# Patient Record
Sex: MALE | Race: WHITE | NOT HISPANIC OR LATINO | Employment: FULL TIME | URBAN - METROPOLITAN AREA
[De-identification: names, ages, dates, MRNs, and addresses within clinical notes are randomized per-mention and may not be internally consistent; named-entity substitution may affect disease eponyms.]

---

## 2017-01-06 ENCOUNTER — HOSPITAL ENCOUNTER (EMERGENCY)
Facility: CLINIC | Age: 32
Discharge: HOME | End: 2017-01-06
Attending: PHYSICIAN ASSISTANT | Admitting: PHYSICIAN ASSISTANT
Payer: COMMERCIAL

## 2017-01-06 VITALS
SYSTOLIC BLOOD PRESSURE: 172 MMHG | RESPIRATION RATE: 16 BRPM | OXYGEN SATURATION: 99 % | TEMPERATURE: 98.5 F | DIASTOLIC BLOOD PRESSURE: 108 MMHG

## 2017-01-06 DIAGNOSIS — J32.0 LEFT MAXILLARY SINUSITIS: ICD-10-CM

## 2017-01-06 DIAGNOSIS — R03.0 ELEVATED BLOOD PRESSURE READING WITHOUT DIAGNOSIS OF HYPERTENSION: ICD-10-CM

## 2017-01-06 PROCEDURE — 99213 OFFICE O/P EST LOW 20 MIN: CPT | Performed by: PHYSICIAN ASSISTANT

## 2017-01-06 PROCEDURE — 99213 OFFICE O/P EST LOW 20 MIN: CPT

## 2017-01-06 NOTE — ED PROVIDER NOTES
History     Chief Complaint   Patient presents with     Sinusitis     HPI  Serge Mcfarlane is a 31 year old male here with concerns about sinus infection.  He states onset of symptoms three days ago.  He states that beginning last week he developed cold with nasal congestion, sore throat and cough.  In the last several days he has developed left maxillary sinus pressure and left sided dental pressure.  He has attempted treatment with DayQuil, last dose was 3 hours prior to arrival.  He states he has a history of syncopal sinus infection previously.  He denies any prior imaging of the sinuses are ENT evaluation.  No history of facial bone fracture.     Past Medical History   Diagnosis Date     NO ACTIVE PROBLEMS      Social History   Substance Use Topics     Smoking status: Current Every Day Smoker -- 1.00 packs/day for 6 years     Types: Cigarettes     Smokeless tobacco: Not on file     Alcohol Use: 15.0 oz/week     I have reviewed the Medications, Allergies, Past Medical and Surgical History, and Social History in the Epic system.    Review of Systems  CONSTITUTIONAL:NEGATIVE for fever, chills, change in weight  INTEGUMENTARY/SKIN: NEGATIVE for worrisome rashes, moles or lesions  EYES: NEGATIVE for vision changes or irritation  ENT/MOUTH: POSITIVE for nasal congestion, left sided sinus pressure, resolved sore throat   RESP:POSITIVE for resolved cough and NEGATIVE for wheezing, SOB/dyspnea  GI: NEGATIVE for abdominal pain, diarrhea, nausea and vomiting  Physical Exam   /108 mmHg  Temp(Src) 98.5  F (36.9  C) (Oral)  Resp 16  SpO2 99%  Physical Exam  Exam:GENERAL APPEARANCE: healthy, alert and no distress  EYES: EOMI,  PERRL, conjunctiva clear  HENT: ear canals and TM's normal.  Nasal mucosa edema.  Tenderness on palpation of the left maxillary sinus.  Posterior pharynx is not erythematous that exudate.  NECK: supple, nontender, no lymphadenopathy  RESP: lungs clear to auscultation - no rales, rhonchi or  wheezes  CV: regular rates and rhythm, normal S1 S2, no murmur noted  SKIN: no suspicious lesions or rashes  ED Course   Procedures        Critical Care time:  none            Labs Ordered and Resulted from Time of ED Arrival Up to the Time of Departure from the ED - No data to display    Assessments & Plan (with Medical Decision Making)     I have reviewed the nursing notes.    I have reviewed the findings, diagnosis, plan and need for follow up with the patient.    Discharge Medication List as of 1/6/2017  5:25 PM      START taking these medications    Details   amoxicillin-clavulanate (AUGMENTIN) 875-125 MG per tablet Take 1 tablet by mouth 2 times daily for 10 days, Disp-20 tablet, R-0, E-Prescribe           Final diagnoses:   Left maxillary sinusitis   Elevated blood pressure reading without diagnosis of hypertension    31-year-old male presents to urgent care with concerns for possible sinus infection given three-day history of left-sided sinus pain which was preceded by 1 week history of nasal congestion and cough.  Patient was noted to be significantly hypertensive upon arrival, remainder of vital signs within normal limits.  As above exam findings as described above were consistent with sinusitis.  No focal tendon tenderness to palpation to suggest odontalgia, dental abscess.  This is a patient that generally sinus infections are viral and will resolve spontaneously however given the unilateral pain and  worsening nature of his symptoms, I will cover him with antibiotic for possible bacterial infection.  Differential for his symptoms also include viral URI, allergic rhinitis.  He was discharged home stable with prescription for Augmentin.  He was instructed to use OTC Flonase.  Follow up with PCP if not better within the next 5-7 days.  Worrisome reasons to return to ER/UC sooner discussed.      Disclaimer: This note consists of symbols derived from keyboarding, dictation, and/or voice recognition software.  As a result, there may be errors in the script that have gone undetected.  Please consider this when interpreting information found in the chart.    1/6/2017   [facility] EMERGENCY DEPARTMENT      Avelina Moreno PA-C  01/08/17 2019

## 2017-01-06 NOTE — ED AVS SNAPSHOT
[facility] Emergency Department    [address] [address]    Phone:  [phone]    Fax:  [phone]                                       Serge Mcfarlane   MRN: [id number]    Department:  [facility] Emergency Department   Date of Visit:  1/6/2017           After Visit Summary Signature Page     I have received my discharge instructions, and my questions have been answered. I have discussed any challenges I see with this plan with the nurse or doctor.    ..........................................................................................................................................  Patient/Patient Representative Signature      ..........................................................................................................................................  Patient Representative Print Name and Relationship to Patient    ..................................................               ................................................  Date                                            Time    ..........................................................................................................................................  Reviewed by Signature/Title    ...................................................              ..............................................  Date                                                            Time

## 2017-01-08 ENCOUNTER — APPOINTMENT (OUTPATIENT)
Dept: CT IMAGING | Facility: CLINIC | Age: 32
End: 2017-01-08
Attending: EMERGENCY MEDICINE
Payer: COMMERCIAL

## 2017-01-08 ENCOUNTER — HOSPITAL ENCOUNTER (EMERGENCY)
Facility: CLINIC | Age: 32
Discharge: HOME | End: 2017-01-08
Attending: EMERGENCY MEDICINE | Admitting: EMERGENCY MEDICINE
Payer: COMMERCIAL

## 2017-01-08 VITALS
DIASTOLIC BLOOD PRESSURE: 96 MMHG | HEART RATE: 84 BPM | OXYGEN SATURATION: 97 % | SYSTOLIC BLOOD PRESSURE: 141 MMHG | RESPIRATION RATE: 16 BRPM | TEMPERATURE: 98.1 F

## 2017-01-08 DIAGNOSIS — R51.9 LEFT-SIDED FACE PAIN: ICD-10-CM

## 2017-01-08 DIAGNOSIS — J01.00 ACUTE MAXILLARY SINUSITIS, RECURRENCE NOT SPECIFIED: ICD-10-CM

## 2017-01-08 LAB
ANION GAP SERPL CALCULATED.3IONS-SCNC: 9 MMOL/L (ref 3–14)
BASOPHILS # BLD AUTO: 0.1 10E9/L (ref 0–0.2)
BASOPHILS NFR BLD AUTO: 0.5 %
BUN SERPL-MCNC: 15 MG/DL (ref 7–30)
CALCIUM SERPL-MCNC: 9.1 MG/DL (ref 8.5–10.1)
CHLORIDE SERPL-SCNC: 106 MMOL/L (ref 94–109)
CO2 SERPL-SCNC: 27 MMOL/L (ref 20–32)
CREAT SERPL-MCNC: 0.92 MG/DL (ref 0.66–1.25)
DIFFERENTIAL METHOD BLD: ABNORMAL
EOSINOPHIL # BLD AUTO: 0.2 10E9/L (ref 0–0.7)
EOSINOPHIL NFR BLD AUTO: 1.6 %
ERYTHROCYTE [DISTWIDTH] IN BLOOD BY AUTOMATED COUNT: 11.9 % (ref 10–15)
GFR SERPL CREATININE-BSD FRML MDRD: NORMAL ML/MIN/1.7M2
GLUCOSE SERPL-MCNC: 98 MG/DL (ref 70–99)
HCT VFR BLD AUTO: 47.8 % (ref 40–53)
HGB BLD-MCNC: 16.4 G/DL (ref 13.3–17.7)
IMM GRANULOCYTES # BLD: 0 10E9/L (ref 0–0.4)
IMM GRANULOCYTES NFR BLD: 0.1 %
LYMPHOCYTES # BLD AUTO: 1.5 10E9/L (ref 0.8–5.3)
LYMPHOCYTES NFR BLD AUTO: 16 %
MCH RBC QN AUTO: 29.7 PG (ref 26.5–33)
MCHC RBC AUTO-ENTMCNC: 34.3 G/DL (ref 31.5–36.5)
MCV RBC AUTO: 86 FL (ref 78–100)
MONOCYTES # BLD AUTO: 1.6 10E9/L (ref 0–1.3)
MONOCYTES NFR BLD AUTO: 16.4 %
NEUTROPHILS # BLD AUTO: 6.2 10E9/L (ref 1.6–8.3)
NEUTROPHILS NFR BLD AUTO: 65.4 %
PLATELET # BLD AUTO: 200 10E9/L (ref 150–450)
POTASSIUM SERPL-SCNC: 4.1 MMOL/L (ref 3.4–5.3)
RBC # BLD AUTO: 5.53 10E12/L (ref 4.4–5.9)
SODIUM SERPL-SCNC: 142 MMOL/L (ref 133–144)
WBC # BLD AUTO: 9.5 10E9/L (ref 4–11)

## 2017-01-08 PROCEDURE — 25000128 H RX IP 250 OP 636: Performed by: EMERGENCY MEDICINE

## 2017-01-08 PROCEDURE — 96361 HYDRATE IV INFUSION ADD-ON: CPT

## 2017-01-08 PROCEDURE — 70486 CT MAXILLOFACIAL W/O DYE: CPT

## 2017-01-08 PROCEDURE — 96374 THER/PROPH/DIAG INJ IV PUSH: CPT

## 2017-01-08 PROCEDURE — 99285 EMERGENCY DEPT VISIT HI MDM: CPT | Performed by: EMERGENCY MEDICINE

## 2017-01-08 PROCEDURE — 25000132 ZZH RX MED GY IP 250 OP 250 PS 637: Performed by: EMERGENCY MEDICINE

## 2017-01-08 PROCEDURE — 99285 EMERGENCY DEPT VISIT HI MDM: CPT | Mod: 25

## 2017-01-08 PROCEDURE — 96375 TX/PRO/DX INJ NEW DRUG ADDON: CPT

## 2017-01-08 PROCEDURE — 25500064 ZZH RX 255 OP 636: Performed by: EMERGENCY MEDICINE

## 2017-01-08 PROCEDURE — 25000125 ZZHC RX 250: Performed by: EMERGENCY MEDICINE

## 2017-01-08 PROCEDURE — 70498 CT ANGIOGRAPHY NECK: CPT

## 2017-01-08 PROCEDURE — 80048 BASIC METABOLIC PNL TOTAL CA: CPT | Performed by: EMERGENCY MEDICINE

## 2017-01-08 PROCEDURE — 70450 CT HEAD/BRAIN W/O DYE: CPT

## 2017-01-08 PROCEDURE — 85025 COMPLETE CBC W/AUTO DIFF WBC: CPT | Performed by: EMERGENCY MEDICINE

## 2017-01-08 RX ORDER — OXYCODONE AND ACETAMINOPHEN 5; 325 MG/1; MG/1
2 TABLET ORAL ONCE
Status: COMPLETED | OUTPATIENT
Start: 2017-01-08 | End: 2017-01-08

## 2017-01-08 RX ORDER — ONDANSETRON 2 MG/ML
4 INJECTION INTRAMUSCULAR; INTRAVENOUS ONCE
Status: COMPLETED | OUTPATIENT
Start: 2017-01-08 | End: 2017-01-08

## 2017-01-08 RX ORDER — SODIUM CHLORIDE 9 MG/ML
1000 INJECTION, SOLUTION INTRAVENOUS CONTINUOUS
Status: DISCONTINUED | OUTPATIENT
Start: 2017-01-08 | End: 2017-01-08 | Stop reason: HOSPADM

## 2017-01-08 RX ORDER — IOPAMIDOL 755 MG/ML
70 INJECTION, SOLUTION INTRAVASCULAR ONCE
Status: COMPLETED | OUTPATIENT
Start: 2017-01-08 | End: 2017-01-08

## 2017-01-08 RX ORDER — HYDROMORPHONE HYDROCHLORIDE 1 MG/ML
0.5 INJECTION, SOLUTION INTRAMUSCULAR; INTRAVENOUS; SUBCUTANEOUS ONCE
Status: COMPLETED | OUTPATIENT
Start: 2017-01-08 | End: 2017-01-08

## 2017-01-08 RX ORDER — OXYCODONE AND ACETAMINOPHEN 5; 325 MG/1; MG/1
1-2 TABLET ORAL EVERY 4 HOURS PRN
Qty: 15 TABLET | Refills: 0 | Status: SHIPPED | OUTPATIENT
Start: 2017-01-08 | End: 2023-03-09

## 2017-01-08 RX ADMIN — HYDROMORPHONE HYDROCHLORIDE 0.5 MG: 1 INJECTION, SOLUTION INTRAMUSCULAR; INTRAVENOUS; SUBCUTANEOUS at 13:31

## 2017-01-08 RX ADMIN — SODIUM CHLORIDE 75 ML: 9 INJECTION, SOLUTION INTRAVENOUS at 13:46

## 2017-01-08 RX ADMIN — SODIUM CHLORIDE 1000 ML: 9 INJECTION, SOLUTION INTRAVENOUS at 13:30

## 2017-01-08 RX ADMIN — IOPAMIDOL 70 ML: 755 INJECTION, SOLUTION INTRAVENOUS at 13:46

## 2017-01-08 RX ADMIN — OXYCODONE HYDROCHLORIDE AND ACETAMINOPHEN 2 TABLET: 5; 325 TABLET ORAL at 12:55

## 2017-01-08 RX ADMIN — ONDANSETRON 4 MG: 2 INJECTION INTRAMUSCULAR; INTRAVENOUS at 13:30

## 2017-01-08 ASSESSMENT — ENCOUNTER SYMPTOMS
CONSTITUTIONAL NEGATIVE: 1
TROUBLE SWALLOWING: 0
FACIAL SWELLING: 1
SORE THROAT: 0
RESPIRATORY NEGATIVE: 1
SINUS PRESSURE: 1
RHINORRHEA: 1
NEUROLOGICAL NEGATIVE: 1

## 2017-01-08 NOTE — ED AVS SNAPSHOT
" [facility] [place] Emergency Department    [address] [address]    Phone:  [phone]    Fax:  [phone]                                       Serge Mcfarlane   MRN: [id number]    Department:  [facility] [place] Emergency Department   Date of Visit:  1/8/2017           Patient Information     Date Of Birth          1985        Your diagnoses for this visit were:     Acute maxillary sinusitis, recurrence not specified     Left-sided face pain Severe pain refractory to NSAIDs, secondary to sinusitis       You were seen by Kenton Gil MD.      Follow-up Information     Schedule an appointment as soon as possible for a visit with [facility].    Specialty:  ENT    Why:  Follow up in ear, nose, throat clinic for re-evaluation if symptoms not resolving    Contact information:    [address] [place] [address] [phone]    Additional information:    The medical center is located at   [address] [place] [address] (between [facility] and   [facility] [place], [place] north   of [place]).        Follow up with Primary care clinic.    Why:  For re-evaluation if symptoms not resolving over the next several days        Discharge Instructions         Understanding Nasal Anatomy: Inside View  There is a lot happening under the surface of the nose. The bone and cartilage under the skin give the nose most of its size and shape. Other structures inside and behind the nose help you breathe. Learning the anatomy of the nose can help you further understand how the nose works.       Bone supports the upper third (bridge) of the nose. The upper cartilage supports the side of the nose. The lower cartilage adds support, width, and height and helps shape the nostrils and the tip of the nose. Skin also helps shape the nose.          The nasal cavity is a hollow space behind the nose that air flows through. The septum is a thin \"wall\" made of cartilage and bone. It divides the " inside of the nose into two chambers. The mucous membrane is thin tissue that lines the nose, sinuses, and throat. It warms and moistens the air you breathe in. It also makes the sticky mucus that helps clean the air of dust and other small particles. The turbinates on each side of the nose are curved, bony ridges lined with mucous membrane. They warm and moisten the air you breathe in. The sinuses are hollow, air-filled chambers in the bone around your nose. Mucus from the sinuses drains into the nasal cavity.    7540-4797 [employer]. [address] [place] [address]. All rights reserved. This information is not intended as a substitute for professional medical care. Always follow your healthcare professional's instructions.          Discharge References/Attachments     SINUSITIS, ACUTE (ENGLISH)    SINUSITIS (ANTIBIOTIC TREATMENT) (ENGLISH)    SINUS HEADACHE (ENGLISH)    SINUSITIS, SELF-CARE FOR  (ENGLISH)    SINUSITIS, PREVENTING  (ENGLISH)      24 Hour Appointment Hotline       To make an appointment at any [facility], call [phone] ([phone]). If you don't have a family doctor or clinic, we will help you find one. [facility] clinics are conveniently located to serve the needs of you and your family.             Review of your medicines      START taking        Dose / Directions Last dose taken    oxyCODONE-acetaminophen 5-325 MG per tablet   Commonly known as:  PERCOCET   Dose:  1-2 tablet   Quantity:  15 tablet        Take 1-2 tablets by mouth every 4 hours as needed for pain   Refills:  0          Our records show that you are taking the medicines listed below. If these are incorrect, please call your family doctor or clinic.        Dose / Directions Last dose taken    amoxicillin-clavulanate 875-125 MG per tablet   Commonly known as:  AUGMENTIN   Dose:  1 tablet   Quantity:  20 tablet        Take 1 tablet by mouth 2 times daily for 10 days   Refills:  0        IBUPROFEN  PO   Dose:  600 mg        Take 600 mg by mouth   Refills:  0                Prescriptions were sent or printed at these locations (1 Prescription)                   Other Prescriptions                Printed at Department/Unit printer (1 of 1)         oxyCODONE-acetaminophen (PERCOCET) 5-325 MG per tablet                Procedures and tests performed during your visit     Basic metabolic panel    CBC with platelets, differential    CT Head Neck Angio w/o & w Contrast    CT Head w/o Contrast    Maxillofacial  CT w/o contrast      Orders Needing Specimen Collection     None      Pending Results     Date and Time Order Name Status Description    1/8/2017 1310 CT Head Neck Angio w/o & w Contrast In process     1/8/2017 1310 CT Head w/o Contrast Preliminary             Pending Culture Results     No orders found from 1/7/2017 to 1/9/2017.       Test Results from your hospital stay           1/8/2017  1:44 PM - Interface, Flexilab Results      Component Results     Component Value Ref Range & Units Status    WBC 9.5 4.0 - 11.0 10e9/L Final    RBC Count 5.53 4.4 - 5.9 10e12/L Final    Hemoglobin 16.4 13.3 - 17.7 g/dL Final    Hematocrit 47.8 40.0 - 53.0 % Final    MCV 86 78 - 100 fl Final    MCH 29.7 26.5 - 33.0 pg Final    MCHC 34.3 31.5 - 36.5 g/dL Final    RDW 11.9 10.0 - 15.0 % Final    Platelet Count 200 150 - 450 10e9/L Final    Diff Method Automated Method  Final    % Neutrophils 65.4 % Final    % Lymphocytes 16.0 % Final    % Monocytes 16.4 % Final    % Eosinophils 1.6 % Final    % Basophils 0.5 % Final    % Immature Granulocytes 0.1 % Final    Absolute Neutrophil 6.2 1.6 - 8.3 10e9/L Final    Absolute Lymphocytes 1.5 0.8 - 5.3 10e9/L Final    Absolute Monocytes 1.6 (H) 0.0 - 1.3 10e9/L Final    Absolute Eosinophils 0.2 0.0 - 0.7 10e9/L Final    Absolute Basophils 0.1 0.0 - 0.2 10e9/L Final    Abs Immature Granulocytes 0.0 0 - 0.4 10e9/L Final         1/8/2017  1:57 PM - Interface, Flexilab Results      Component  Results     Component Value Ref Range & Units Status    Sodium 142 133 - 144 mmol/L Final    Potassium 4.1 3.4 - 5.3 mmol/L Final    Chloride 106 94 - 109 mmol/L Final    Carbon Dioxide 27 20 - 32 mmol/L Final    Anion Gap 9 3 - 14 mmol/L Final    Glucose 98 70 - 99 mg/dL Final    Urea Nitrogen 15 7 - 30 mg/dL Final    Creatinine 0.92 0.66 - 1.25 mg/dL Final    GFR Estimate >90  Non  GFR Calc   >60 mL/min/1.7m2 Final    GFR Estimate If Black >90   GFR Calc   >60 mL/min/1.7m2 Final    Calcium 9.1 8.5 - 10.1 mg/dL Final         1/8/2017  2:02 PM - Interface, Radiant Ib      Narrative     CT HEAD WITHOUT CONTRAST 1/8/2017 1:49 PM     HISTORY:  Severe left retro-orbital and facial headache.    TECHNIQUE:  Axial images of the head without IV contrast material.  Radiation dose for this scan was reduced using automated exposure  control, adjustment of the mA and/or kV according to patient size, or  iterative reconstruction technique.    COMPARISON:  None.    FINDINGS:  No intracranial hemorrhage, mass lesion, or acute infarct  is seen. Left maxillary sinus fluid and left ethmoid sinus mucosal  thickening.        Impression     IMPRESSION:  No acute intracranial pathology. Sinusitis.         1/8/2017  1:37 PM - Lashon Ortiz         1/8/2017  3:32 PM - Interface, Radiant Ib      Narrative     CT MAXILLOFACIAL WITHOUT CONTRAST 1/8/2017 2:18 PM     HISTORY: Severe left facial and retro-orbital headache.    TECHNIQUE: Axial images were obtained through the facial bones and  sinuses without contrast. Coronal and sagittal reconstructions were  also acquired. Radiation dose for this scan was reduced using  automated exposure control, adjustment of the mA and/or kV according  to patient size, or iterative reconstruction technique.    COMPARISON: 11/2/2013.    FINDINGS: There is near complete opacification of the left maxillary  sinus with a fluid level. There is some minimal mucosal thickening  "in  the left anterior ethmoid air cells. Incidental note is made of  retention cyst in the right maxillary sinus. Remainder of paranasal  sinuses are clear. The retromaxillary fat in the pterygopalatine fossa  regions are normal. Both globes and retro-orbital structures are  normal.        Impression     IMPRESSION: Left maxillary and ethmoid sinus disease. The presence of  fluid in the left maxillary sinus is consistent with acute sinusitis.    BECKY TANG MD                Thank you for choosing [facility]       Thank you for choosing [facility] for your care. Our goal is always to provide you with excellent care. Hearing back from our patients is one way we can continue to improve our services. Please take a few minutes to complete the written survey that you may receive in the mail after you visit with us. Thank you!        [employer] Information     [email or web address] lets you send messages to your doctor, view your test results, renew your prescriptions, schedule appointments and more. To sign up, go to [email or web address] . Click on \"Log in\" on the left side of the screen, which will take you to the Welcome page. Then click on \"Sign up Now\" on the right side of the page.     You will be asked to enter the access code listed below, as well as some personal information. Please follow the directions to create your username and password.     Your access code is: [email or web address]  Expires: 2017  5:25 PM     Your access code will  in 90 days. If you need help or a new code, please call your [facility] clinic or [phone].        Care EveryWhere ID     This is your Care EveryWhere ID. This could be used by other organizations to access your [facility] medical records  [id number]        After Visit Summary       This is your record. Keep this with you and show to your community pharmacist(s) and doctor(s) at your next visit.                  "

## 2017-01-08 NOTE — ED PROVIDER NOTES
History     Chief Complaint   Patient presents with     Facial Pain     was here friday no relief with medications     HPI  Serge Mcfarlane is a 31 year old male who presents to the emergency department for facial pain. Patient was last seen in the ED on Friday evening, 1/6/17, for evaluation for sinus infection symtoms as well as URI symptoms that started about a week and a half ago. Additonally, he developed left maxillary sinus pressure and dental pressure at that time. Severe sharp, aching pain exacerbated by chewing and dental pressure.  Pain is constant, radiates upward and is worse with change in position and head movement. He is currently experiencing a sharp pain on left side of face around eye, nose, sinus area. Denies nasal drainage. Wife and child were ill on 12/24/16. Took 600 mg ibuprofen around 1100 AM this morning.    No visual or eye abnormality.  No neurologic deficit or abnormality no fever or chills.  Patient was prescribed Augmentin but has felt no relief or improvement after 4 doses of therapy.     I have reviewed the Medications, Allergies, Past Medical and Surgical History, and Social History in the Epic system.  There is no problem list on file for this patient.      Current Outpatient Prescriptions   Medication Sig Dispense Refill     IBUPROFEN PO Take 600 mg by mouth       oxyCODONE-acetaminophen (PERCOCET) 5-325 MG per tablet Take 1-2 tablets by mouth every 4 hours as needed for pain 15 tablet 0     amoxicillin-clavulanate (AUGMENTIN) 875-125 MG per tablet Take 1 tablet by mouth 2 times daily for 20 doses 20 tablet 0       No Known Allergies    Social History   Substance Use Topics     Smoking status: Current Every Day Smoker -- 1.00 packs/day for 6 years     Types: Cigarettes     Smokeless tobacco: Not on file     Alcohol Use: 15.0 oz/week         Review of Systems   Constitutional: Negative.    HENT: Positive for facial swelling, rhinorrhea and sinus pressure. Negative for congestion,  dental problem, drooling, ear discharge, ear pain, sore throat, tinnitus and trouble swallowing.    Respiratory: Negative.    Skin: Negative.    Neurological: Negative.    As mentioned above in the history present illness.  All other systems were reviewed and are negative.      Physical Exam   Pulse: 84  Heart Rate: 84  Temp: 98.1  F (36.7  C)  Resp: 16  SpO2: 97 %    Physical Exam   Constitutional: He is oriented to person, place, and time. He appears well-developed and well-nourished. No distress.   HENT:   Head: Normocephalic and atraumatic.   Right Ear: External ear normal.   Left Ear: External ear normal.   Mouth/Throat: Oropharynx is clear and moist. No oropharyngeal exudate.   Bilateral nasal mucosal injection with significantly injected left nasal mucosa.   Eyes: Conjunctivae and EOM are normal. Pupils are equal, round, and reactive to light. No scleral icterus.   Neck: Normal range of motion. Neck supple. No JVD present. No tracheal deviation present. No thyromegaly present.   Cardiovascular: Normal rate, regular rhythm and normal heart sounds.  Exam reveals no gallop and no friction rub.    No murmur heard.  Pulmonary/Chest: Effort normal and breath sounds normal. No stridor. No respiratory distress. He has no wheezes. He has no rales.   Musculoskeletal: Normal range of motion. He exhibits no edema.   Lymphadenopathy:     He has no cervical adenopathy.   Neurological: He is alert and oriented to person, place, and time. He has normal strength. Cranial nerve deficit: 2-12 intact. Coordination and gait normal.   Skin: Skin is warm and dry. No rash noted. He is not diaphoretic. No erythema. No pallor.   Psychiatric: His behavior is normal.   Anxious affect.   Nursing note and vitals reviewed.    As mentioned above in the history present illness. All other systems were reviewed and are negative.    ED Course   Procedures            CTA angiogram head and neck: Preliminary results -  lt max sinus dz with AFL.    neg cta.  final report after recons.    Results for orders placed or performed during the hospital encounter of 01/08/17   CT Head w/o Contrast    Narrative    CT HEAD WITHOUT CONTRAST 1/8/2017 1:49 PM     HISTORY:  Severe left retro-orbital and facial headache.    TECHNIQUE:  Axial images of the head without IV contrast material.  Radiation dose for this scan was reduced using automated exposure  control, adjustment of the mA and/or kV according to patient size, or  iterative reconstruction technique.    COMPARISON:  None.    FINDINGS:  No intracranial hemorrhage, mass lesion, or acute infarct  is seen. Left maxillary sinus fluid and left ethmoid sinus mucosal  thickening.      Impression    IMPRESSION:  No acute intracranial pathology. Sinusitis.   CT Head Neck Angio w/o & w Contrast    Narrative    CTA ANGIOGRAM HEAD NECK 1/8/2017 2:13 PM     HISTORY: Severe left retro-orbital and facial headache.    TECHNIQUE: Axial images were obtained through the head and neck  without and with intravenous contrast. 70 mL of Isovue-370 was given.  Multiplanar reconstructions were performed. 3-D reconstructions off a  remote workstation for CT angiography were also acquired. Carotid  stenoses were provided by comparing the caliber of the proximal  internal carotid arteries to the caliber of the distal internal  carotid arteries. Radiation dose for this scan was reduced using  automated exposure control, adjustment of the mA and/or kV according  to patient size, or iterative reconstruction technique.    FINDINGS:    Brachiocephalic vessels: Normal.    Right carotid system: Normal.    Left carotid system: Normal.    Right vertebral artery: Normal.    Left vertebral artery: Normal.    Senoia of Zimm[place]: Normal.    Other findings: Extensive left maxillary sinus disease with air-fluid  level present. There is also some left ethmoid opacification. Images  through the neck are unremarkable.      Impression    IMPRESSION:  1. Negative  CT angiography of head and neck.  2. Left maxillary ethmoid and sinus disease with air-fluid level in  left maxillary sinus suggesting acute sinusitis.     BECKY TANG MD   Maxillofacial  CT w/o contrast    Narrative    CT MAXILLOFACIAL WITHOUT CONTRAST 1/8/2017 2:18 PM     HISTORY: Severe left facial and retro-orbital headache.    TECHNIQUE: Axial images were obtained through the facial bones and  sinuses without contrast. Coronal and sagittal reconstructions were  also acquired. Radiation dose for this scan was reduced using  automated exposure control, adjustment of the mA and/or kV according  to patient size, or iterative reconstruction technique.    COMPARISON: 11/2/2013.    FINDINGS: There is near complete opacification of the left maxillary  sinus with a fluid level. There is some minimal mucosal thickening in  the left anterior ethmoid air cells. Incidental note is made of  retention cyst in the right maxillary sinus. Remainder of paranasal  sinuses are clear. The retromaxillary fat in the pterygopalatine fossa  regions are normal. Both globes and retro-orbital structures are  normal.      Impression    IMPRESSION: Left maxillary and ethmoid sinus disease. The presence of  fluid in the left maxillary sinus is consistent with acute sinusitis.    BECKY TANG MD   CBC with platelets, differential   Result Value Ref Range    WBC 9.5 4.0 - 11.0 10e9/L    RBC Count 5.53 4.4 - 5.9 10e12/L    Hemoglobin 16.4 13.3 - 17.7 g/dL    Hematocrit 47.8 40.0 - 53.0 %    MCV 86 78 - 100 fl    MCH 29.7 26.5 - 33.0 pg    MCHC 34.3 31.5 - 36.5 g/dL    RDW 11.9 10.0 - 15.0 %    Platelet Count 200 150 - 450 10e9/L    Diff Method Automated Method     % Neutrophils 65.4 %    % Lymphocytes 16.0 %    % Monocytes 16.4 %    % Eosinophils 1.6 %    % Basophils 0.5 %    % Immature Granulocytes 0.1 %    Absolute Neutrophil 6.2 1.6 - 8.3 10e9/L    Absolute Lymphocytes 1.5 0.8 - 5.3 10e9/L    Absolute Monocytes 1.6 (H) 0.0 - 1.3 10e9/L     Absolute Eosinophils 0.2 0.0 - 0.7 10e9/L    Absolute Basophils 0.1 0.0 - 0.2 10e9/L    Abs Immature Granulocytes 0.0 0 - 0.4 10e9/L   Basic metabolic panel   Result Value Ref Range    Sodium 142 133 - 144 mmol/L    Potassium 4.1 3.4 - 5.3 mmol/L    Chloride 106 94 - 109 mmol/L    Carbon Dioxide 27 20 - 32 mmol/L    Anion Gap 9 3 - 14 mmol/L    Glucose 98 70 - 99 mg/dL    Urea Nitrogen 15 7 - 30 mg/dL    Creatinine 0.92 0.66 - 1.25 mg/dL    GFR Estimate >90  Non  GFR Calc   >60 mL/min/1.7m2    GFR Estimate If Black >90   GFR Calc   >60 mL/min/1.7m2    Calcium 9.1 8.5 - 10.1 mg/dL          Medications   0.9% sodium chloride infusion (not administered)   oxyCODONE-acetaminophen (PERCOCET) 5-325 MG per tablet 2 tablet (2 tablets Oral Given 1/8/17 1255)   0.9% sodium chloride BOLUS (0 mLs Intravenous Stopped 1/8/17 1558)   HYDROmorphone (PF) (DILAUDID) injection 0.5 mg (0.5 mg Intravenous Given 1/8/17 1331)   ondansetron (ZOFRAN) injection 4 mg (4 mg Intravenous Given 1/8/17 1330)   iopamidol (ISOVUE-370) solution 70 mL (70 mLs Intravenous Given 1/8/17 1346)   sodium chloride 0.9 % for CT scan flush dose 75 mL (75 mLs As instructed Given 1/8/17 1346)     Discussed risk and benefits of CT evaluation and patient wishes to proceed with CT evaluation.    Assessments & Plan (with Medical Decision Making)   Left maxillary and ethmoid sinusitis with severe facial pain and symptoms refractory to 2 days of Augmentin therapy, 4 doses, and NSAIDs.  Patient wished to proceed with CT evaluation due to severity and intractability of the pain.  CT/CTA evaluation of the head and neck were only remarkable for sinusitis.  Will continue Augmentin, add 10 days of therapy to complete 20 days of therapy, and Rx Percocet for pain refractory to NSAIDs.  Instructed in supportive care.  Patient was provided instructions for supportive care and will return as needed for worsened condition or worsening symptoms, or  new problems or concerns.    I  have reviewed the nursing notes.    I have reviewed the findings, diagnosis, plan and need for follow up with the patient.    Discharge Medication List as of 1/8/2017  3:58 PM      START taking these medications    Details   oxyCODONE-acetaminophen (PERCOCET) 5-325 MG per tablet Take 1-2 tablets by mouth every 4 hours as needed for pain, Disp-15 tablet, R-0, Local Print             Final diagnoses:   Acute maxillary sinusitis, recurrence not specified   Left-sided face pain - Severe pain refractory to NSAIDs, secondary to sinusitis     This document serves as a record of the services and decisions personally performed and made by Kenton Gil MD. It was created on HIS/HER behalf by Lisette Cardona, a trained medical scribe. The creation of this document is based the provider's statements to the medical scribe.  Lisette Cardona 12:33 PM 1/8/2017    Provider:   The information in this document, created by the medical scribe for me, accurately reflects the services I personally performed and the decisions made by me. I have reviewed and approved this document for accuracy prior to leaving the patient care area.  Kenton Gil MD 12:33 PM 1/8/2017 1/8/2017   [facility] EMERGENCY DEPARTMENT      Kenton Gil MD  01/08/17 1614    Kenton Gil MD  01/08/17 9107

## 2017-01-08 NOTE — ED AVS SNAPSHOT
[facility] Emergency Department    [address] [address]    Phone:  [phone]    Fax:  [phone]                                       Serge Mcfarlane   MRN: [id number]    Department:  [facility] Emergency Department   Date of Visit:  1/8/2017           After Visit Summary Signature Page     I have received my discharge instructions, and my questions have been answered. I have discussed any challenges I see with this plan with the nurse or doctor.    ..........................................................................................................................................  Patient/Patient Representative Signature      ..........................................................................................................................................  Patient Representative Print Name and Relationship to Patient    ..................................................               ................................................  Date                                            Time    ..........................................................................................................................................  Reviewed by Signature/Title    ...................................................              ..............................................  Date                                                            Time

## 2017-01-08 NOTE — DISCHARGE INSTRUCTIONS
"  Understanding Nasal Anatomy: Inside View  There is a lot happening under the surface of the nose. The bone and cartilage under the skin give the nose most of its size and shape. Other structures inside and behind the nose help you breathe. Learning the anatomy of the nose can help you further understand how the nose works.       Bone supports the upper third (bridge) of the nose. The upper cartilage supports the side of the nose. The lower cartilage adds support, width, and height and helps shape the nostrils and the tip of the nose. Skin also helps shape the nose.          The nasal cavity is a hollow space behind the nose that air flows through. The septum is a thin \"wall\" made of cartilage and bone. It divides the inside of the nose into two chambers. The mucous membrane is thin tissue that lines the nose, sinuses, and throat. It warms and moistens the air you breathe in. It also makes the sticky mucus that helps clean the air of dust and other small particles. The turbinates on each side of the nose are curved, bony ridges lined with mucous membrane. They warm and moisten the air you breathe in. The sinuses are hollow, air-filled chambers in the bone around your nose. Mucus from the sinuses drains into the nasal cavity.    [id number] [employer]. [address] [place] [address], [place] [address]. All rights reserved. This information is not intended as a substitute for professional medical care. Always follow your healthcare professional's instructions.        "

## 2018-04-09 ENCOUNTER — OFFICE VISIT (OUTPATIENT)
Dept: UROLOGY | Facility: CLINIC | Age: 33
End: 2018-04-09
Payer: COMMERCIAL

## 2018-04-09 VITALS — DIASTOLIC BLOOD PRESSURE: 98 MMHG | HEART RATE: 92 BPM | SYSTOLIC BLOOD PRESSURE: 169 MMHG | TEMPERATURE: 98.7 F

## 2018-04-09 DIAGNOSIS — R30.0 DYSURIA: Primary | ICD-10-CM

## 2018-04-09 DIAGNOSIS — N30.00 ACUTE CYSTITIS WITHOUT HEMATURIA: ICD-10-CM

## 2018-04-09 LAB
ALBUMIN UR-MCNC: NEGATIVE MG/DL
APPEARANCE UR: CLEAR
BILIRUB UR QL STRIP: NEGATIVE
COLOR UR AUTO: YELLOW
GLUCOSE UR STRIP-MCNC: NEGATIVE MG/DL
HGB UR QL STRIP: NEGATIVE
KETONES UR STRIP-MCNC: NEGATIVE MG/DL
LEUKOCYTE ESTERASE UR QL STRIP: NEGATIVE
NITRATE UR QL: NEGATIVE
PH UR STRIP: 6 PH (ref 5–7)
RBC #/AREA URNS AUTO: NORMAL /HPF
SOURCE: NORMAL
SP GR UR STRIP: 1.02 (ref 1–1.03)
UROBILINOGEN UR STRIP-ACNC: 0.2 EU/DL (ref 0.2–1)
WBC #/AREA URNS AUTO: NORMAL /HPF

## 2018-04-09 PROCEDURE — 81001 URINALYSIS AUTO W/SCOPE: CPT | Performed by: UROLOGY

## 2018-04-09 PROCEDURE — 51798 US URINE CAPACITY MEASURE: CPT | Performed by: UROLOGY

## 2018-04-09 PROCEDURE — 87086 URINE CULTURE/COLONY COUNT: CPT | Performed by: UROLOGY

## 2018-04-09 PROCEDURE — 99204 OFFICE O/P NEW MOD 45 MIN: CPT | Mod: 25 | Performed by: UROLOGY

## 2018-04-09 RX ORDER — IBUPROFEN 200 MG
600 TABLET ORAL
COMMUNITY
Start: 2017-12-29 | End: 2023-03-09

## 2018-04-09 RX ORDER — OXYBUTYNIN CHLORIDE 5 MG/1
5 TABLET, EXTENDED RELEASE ORAL DAILY
Qty: 30 TABLET | Refills: 1 | Status: SHIPPED | OUTPATIENT
Start: 2018-04-09

## 2018-04-09 RX ORDER — OMEPRAZOLE 40 MG/1
40 CAPSULE, DELAYED RELEASE ORAL
COMMUNITY
Start: 2017-12-29 | End: 2023-03-09

## 2018-04-09 NOTE — NURSING NOTE
"Chief Complaint   Patient presents with     Consult     Dysuria       Initial BP (!) 169/98 (BP Location: Right arm, Patient Position: Sitting, Cuff Size: Adult Regular)  Pulse 92  Temp 98.7  F (37.1  C) (Tympanic) Estimated body mass index is 23.11 kg/(m^2) as calculated from the following:    Height as of 12/4/06: 1.88 m (6' 2\").    Weight as of 12/4/06: 81.6 kg (180 lb).  Medication Reconciliation: complete     Farooq CH CMA      "

## 2018-04-09 NOTE — MR AVS SNAPSHOT
"              After Visit Summary   2018    Serge Mcfarlane    MRN: [id number]           Patient Information     Date Of Birth          1985        Visit Information        Provider Department      2018 2:30 PM RADHA Garcia MD [facility]        Today's Diagnoses     Dysuria    -  1    Acute cystitis without hematuria          Care Instructions    Per Physician's instructions                    Follow-ups after your visit        Who to contact     If you have questions or need follow up information about today's clinic visit or your schedule please contact [facility] directly at [phone].  Normal or non-critical lab and imaging results will be communicated to you by [employer], letter or phone within 4 business days after the clinic has received the results. If you do not hear from us within 7 days, please contact the clinic through [employer] or phone. If you have a critical or abnormal lab result, we will notify you by phone as soon as possible.  Submit refill requests through [employer] or call your pharmacy and they will forward the refill request to us. Please allow 3 business days for your refill to be completed.          Additional Information About Your Visit        MyChart Information     [employer] lets you send messages to your doctor, view your test results, renew your prescriptions, schedule appointments and more. To sign up, go to [email or web address] . Click on \"Log in\" on the left side of the screen, which will take you to the Welcome page. Then click on \"Sign up Now\" on the right side of the page.     You will be asked to enter the access code listed below, as well as some personal information. Please follow the directions to create your username and password.     Your access code is: [email or web address]  Expires: 2018  7:42 AM     Your access code will  in 90 days. If you need help or a new code, please call your [facility] or [phone].        Care " EveryWhere ID     This is your Care EveryWhere ID. This could be used by other organizations to access your [facility] medical records  [id number]        Your Vitals Were     Pulse Temperature                92 98.7  F (37.1  C) (Tympanic)           Blood Pressure from Last 3 Encounters:   04/09/18 (!) 169/98   01/08/17 (!) 141/96   01/06/17 (!) 172/108    Weight from Last 3 Encounters:   12/04/06 81.6 kg (180 lb)              We Performed the Following     MEASURE POST-VOID RESIDUAL URINE/BLADDER CAPACITY, US NON-IMAGING     UA with Microscopic     Urine Culture Aerobic Bacterial          Today's Medication Changes          These changes are accurate as of 4/9/18 11:59 PM.  If you have any questions, ask your nurse or doctor.               Start taking these medicines.        Dose/Directions    oxybutynin 5 MG 24 hr tablet   Commonly known as:  DITROPAN XL   Used for:  Dysuria   Started by:  RADHA Garcia MD        Dose:  5 mg   Take 1 tablet (5 mg) by mouth daily   Quantity:  30 tablet   Refills:  1            Where to get your medicines      [facility] [address] [place] [address] AT [facility] OF [address] [place] [address]  [address] [facility] [address]     Phone:  [phone]     oxybutynin 5 MG 24 hr tablet                Primary Care Provider Office Phone # Fax #    Presley Nam [phone]       [facility] [address] [facility] [address]        Equal Access to Services     LAUREN GREGORY AH: Hadii renee ku hadasho Soomaali, waaxda luqadaha, qaybta kaalmada adeegyada, [address]. So [facility] [phone].    ATENCIÓN: Si habla español, tiene a pitts disposición servicios gratuitos de asistencia lingüística. Llame al [phone].    We comply with applicable federal civil rights laws and [place] laws. We do not discriminate on the basis of race, color, national origin, age, disability, sex,  sexual orientation, or gender identity.            Thank you!     Thank you for choosing [facility]  for your care. Our goal is always to provide you with excellent care. Hearing back from our patients is one way we can continue to improve our services. Please take a few minutes to complete the written survey that you may receive in the mail after your visit with us. Thank you!             Your Updated Medication List - Protect others around you: Learn how to safely use, store and throw away your medicines at www.disposemymeds.org.          This list is accurate as of 4/9/18 11:59 PM.  Always use your most recent med list.                   Brand Name Dispense Instructions for use Diagnosis    ibuprofen 200 MG tablet    ADVIL/MOTRIN     Take 600 mg by mouth        omeprazole 40 MG capsule    priLOSEC     Take 40 mg by mouth        oxybutynin 5 MG 24 hr tablet    DITROPAN XL    30 tablet    Take 1 tablet (5 mg) by mouth daily    Dysuria       oxyCODONE-acetaminophen 5-325 MG per tablet    PERCOCET    15 tablet    Take 1-2 tablets by mouth every 4 hours as needed for pain

## 2018-04-09 NOTE — NURSING NOTE
Active order to obtain bladder scan? Yes   Name of ordering provider: Dr. Garcia  Bladder scan preformed post void Yes.  Bladder scan reveled 0ML  Provider notified?  Yes    Farooq CH CMA

## 2018-04-10 NOTE — PROGRESS NOTES
Appointment source: New Patient  Patient name: Serge Mcfarlane  Urology Staff: Leonid Garcia MD    Subjective: This is a 32 year old year old male complaining of recurrent dysuria.    Objective:  Complains of urinary discomfort that occurs intermittently each week.    No associated with any particular activity.    Has noted the alcohol consumption may promote the discomfort. Admits to about a liter of vodka each week end.    Denies childhood enuresis but brother had enuresis until his teens.    Examination:    Abdomen benign  External genitalia normal  Rectal normal  Prostate benign    Assessment:  Possible neurogenic bladder based on family history of enuresis.    Worrisome volume of alcohol consumption.    Plan:  Oxybutynin to control dysuria that may be a result of congenital neurogenic bladder dysfunction (bladder spasms). Counseled him that his alcohol consumption is excessive and should be reduced.    Return visit in about one month.    Total time 45 minutes, counseling 25 minutes discussing neurogenic bladder dysfunction.

## 2018-04-11 LAB
BACTERIA SPEC CULT: NO GROWTH
Lab: NORMAL
SPECIMEN SOURCE: NORMAL

## 2018-05-07 ENCOUNTER — HOSPITAL ENCOUNTER (EMERGENCY)
Facility: CLINIC | Age: 33
Discharge: HOME | End: 2018-05-07
Attending: EMERGENCY MEDICINE | Admitting: EMERGENCY MEDICINE
Payer: COMMERCIAL

## 2018-05-07 ENCOUNTER — APPOINTMENT (OUTPATIENT)
Dept: GENERAL RADIOLOGY | Facility: CLINIC | Age: 33
End: 2018-05-07
Attending: EMERGENCY MEDICINE
Payer: COMMERCIAL

## 2018-05-07 VITALS
SYSTOLIC BLOOD PRESSURE: 138 MMHG | TEMPERATURE: 97.7 F | OXYGEN SATURATION: 97 % | RESPIRATION RATE: 10 BRPM | DIASTOLIC BLOOD PRESSURE: 95 MMHG

## 2018-05-07 DIAGNOSIS — R07.9 CHEST PAIN, UNSPECIFIED TYPE: ICD-10-CM

## 2018-05-07 LAB
ALBUMIN SERPL-MCNC: 4.4 G/DL (ref 3.4–5)
ALP SERPL-CCNC: 75 U/L (ref 40–150)
ALT SERPL W P-5'-P-CCNC: 25 U/L (ref 0–70)
ANION GAP SERPL CALCULATED.3IONS-SCNC: 7 MMOL/L (ref 3–14)
AST SERPL W P-5'-P-CCNC: 20 U/L (ref 0–45)
BASOPHILS # BLD AUTO: 0.1 10E9/L (ref 0–0.2)
BASOPHILS NFR BLD AUTO: 1 %
BILIRUB SERPL-MCNC: 0.8 MG/DL (ref 0.2–1.3)
BUN SERPL-MCNC: 17 MG/DL (ref 7–30)
CALCIUM SERPL-MCNC: 9 MG/DL (ref 8.5–10.1)
CHLORIDE SERPL-SCNC: 107 MMOL/L (ref 94–109)
CO2 SERPL-SCNC: 25 MMOL/L (ref 20–32)
CREAT SERPL-MCNC: 0.98 MG/DL (ref 0.66–1.25)
DIFFERENTIAL METHOD BLD: NORMAL
EOSINOPHIL # BLD AUTO: 0.2 10E9/L (ref 0–0.7)
EOSINOPHIL NFR BLD AUTO: 3.2 %
ERYTHROCYTE [DISTWIDTH] IN BLOOD BY AUTOMATED COUNT: 11.5 % (ref 10–15)
GFR SERPL CREATININE-BSD FRML MDRD: 87 ML/MIN/1.7M2
GLUCOSE SERPL-MCNC: 136 MG/DL (ref 70–99)
HCT VFR BLD AUTO: 45.3 % (ref 40–53)
HGB BLD-MCNC: 16 G/DL (ref 13.3–17.7)
IMM GRANULOCYTES # BLD: 0 10E9/L (ref 0–0.4)
IMM GRANULOCYTES NFR BLD: 0.2 %
LYMPHOCYTES # BLD AUTO: 1.9 10E9/L (ref 0.8–5.3)
LYMPHOCYTES NFR BLD AUTO: 31.3 %
MCH RBC QN AUTO: 30.3 PG (ref 26.5–33)
MCHC RBC AUTO-ENTMCNC: 35.3 G/DL (ref 31.5–36.5)
MCV RBC AUTO: 86 FL (ref 78–100)
MONOCYTES # BLD AUTO: 1 10E9/L (ref 0–1.3)
MONOCYTES NFR BLD AUTO: 15.6 %
NEUTROPHILS # BLD AUTO: 3 10E9/L (ref 1.6–8.3)
NEUTROPHILS NFR BLD AUTO: 48.7 %
PLATELET # BLD AUTO: 220 10E9/L (ref 150–450)
POTASSIUM SERPL-SCNC: 4 MMOL/L (ref 3.4–5.3)
PROT SERPL-MCNC: 7.9 G/DL (ref 6.8–8.8)
RBC # BLD AUTO: 5.28 10E12/L (ref 4.4–5.9)
SODIUM SERPL-SCNC: 139 MMOL/L (ref 133–144)
TROPONIN I SERPL-MCNC: <0.015 UG/L (ref 0–0.04)
WBC # BLD AUTO: 6.2 10E9/L (ref 4–11)

## 2018-05-07 PROCEDURE — 85025 COMPLETE CBC W/AUTO DIFF WBC: CPT | Performed by: EMERGENCY MEDICINE

## 2018-05-07 PROCEDURE — 99285 EMERGENCY DEPT VISIT HI MDM: CPT | Mod: 25 | Performed by: EMERGENCY MEDICINE

## 2018-05-07 PROCEDURE — 93010 ELECTROCARDIOGRAM REPORT: CPT | Mod: Z6 | Performed by: EMERGENCY MEDICINE

## 2018-05-07 PROCEDURE — 80053 COMPREHEN METABOLIC PANEL: CPT | Performed by: EMERGENCY MEDICINE

## 2018-05-07 PROCEDURE — 71046 X-RAY EXAM CHEST 2 VIEWS: CPT

## 2018-05-07 PROCEDURE — 84484 ASSAY OF TROPONIN QUANT: CPT | Performed by: EMERGENCY MEDICINE

## 2018-05-07 PROCEDURE — 93005 ELECTROCARDIOGRAM TRACING: CPT | Performed by: EMERGENCY MEDICINE

## 2018-05-07 NOTE — ED AVS SNAPSHOT
[facility]    [address]    [facility] [address]    Phone:  [phone]    Fax:  [phone]                                       Serge Mcfarlane   MRN: [id number]    Department:  [facility]   Date of Visit:  5/7/2018           Patient Information     Date Of Birth          1985        Your diagnoses for this visit were:     Chest pain, unspecified type        You were seen by Escobar Lewis MD.      Follow-up Information     Follow up with Presley Nam MD.    Specialty:  Family Practice    Contact information:    [facility]  [address] [place] [address]  [phone]          Discharge Instructions          *CHEST PAIN, UNCERTAIN CAUSE    Based on your exam today, the exact cause of your chest pain is not certain. Your condition does not seem serious at this time, and your pain does not appear to be coming from your heart. However, sometimes the signs of a serious problem take more time to appear. Therefore, watch for the warning signs listed below.  HOME CARE:    1. Rest today and avoid strenuous activity.  2. Take any prescribed medicine as directed.  FOLLOW UP with your doctor in 1-3 days.   GET PROMPT MEDICAL ATTENTION if any of the following occur:    A change in the type of pain: if it feels different, becomes more severe, lasts longer, or begins to spread into your shoulder, arm, neck, jaw or back    Shortness of breath or increased pain with breathing    Weakness, dizziness, or fainting    Cough with blood or dark colored sputum (phlegm)    Fever over 101  F (38.3  C)    Swelling, pain or redness in one leg    2114-1606 [employer]. [address] [place] [address]. All rights reserved. This information is not intended as a substitute for professional medical care. Always follow your healthcare professional's instructions.  This information has been modified by your health care provider with  permission from the publisher.      24 Hour Appointment Hotline       To make an appointment at any [facility], call [phone] ([phone]). If you don't have a family doctor or clinic, we will help you find one. [facility] are conveniently located to serve the needs of you and your family.             Review of your medicines      Our records show that you are taking the medicines listed below. If these are incorrect, please call your family doctor or clinic.        Dose / Directions Last dose taken    ibuprofen 200 MG tablet   Commonly known as:  ADVIL/MOTRIN   Dose:  600 mg        Take 600 mg by mouth   Refills:  0        omeprazole 40 MG capsule   Commonly known as:  priLOSEC   Dose:  40 mg        Take 40 mg by mouth   Refills:  0        oxybutynin 5 MG 24 hr tablet   Commonly known as:  DITROPAN XL   Dose:  5 mg   Quantity:  30 tablet        Take 1 tablet (5 mg) by mouth daily   Refills:  1        oxyCODONE-acetaminophen 5-325 MG per tablet   Commonly known as:  PERCOCET   Dose:  1-2 tablet   Quantity:  15 tablet        Take 1-2 tablets by mouth every 4 hours as needed for pain   Refills:  0                Procedures and tests performed during your visit     CBC with platelets differential    Comprehensive metabolic panel    EKG 12-lead, tracing only    Peripheral IV catheter    Troponin I    XR Chest 2 Views      Orders Needing Specimen Collection     None      Pending Results     Date and Time Order Name Status Description    5/7/2018 0711 XR Chest 2 Views Preliminary             Pending Culture Results     No orders found from 5/5/2018 to 5/8/2018.            Pending Results Instructions     If you had any lab results that were not finalized at the time of your Discharge, you can call the ED Lab Result RN at [phone]. You will be contacted by this team for any positive Lab results or changes in treatment. The nurses are available 7 days a week from 10A to 6:30P.  You can leave a  message 24 hours per day and they will return your call.        Test Results From Your Hospital Stay        5/7/2018  6:35 AM      Component Results     Component Value Ref Range & Units Status    WBC 6.2 4.0 - 11.0 10e9/L Final    RBC Count 5.28 4.4 - 5.9 10e12/L Final    Hemoglobin 16.0 13.3 - 17.7 g/dL Final    Hematocrit 45.3 40.0 - 53.0 % Final    MCV 86 78 - 100 fl Final    MCH 30.3 26.5 - 33.0 pg Final    MCHC 35.3 31.5 - 36.5 g/dL Final    RDW 11.5 10.0 - 15.0 % Final    Platelet Count 220 150 - 450 10e9/L Final    Diff Method Automated Method  Final    % Neutrophils 48.7 % Final    % Lymphocytes 31.3 % Final    % Monocytes 15.6 % Final    % Eosinophils 3.2 % Final    % Basophils 1.0 % Final    % Immature Granulocytes 0.2 % Final    Absolute Neutrophil 3.0 1.6 - 8.3 10e9/L Final    Absolute Lymphocytes 1.9 0.8 - 5.3 10e9/L Final    Absolute Monocytes 1.0 0.0 - 1.3 10e9/L Final    Absolute Eosinophils 0.2 0.0 - 0.7 10e9/L Final    Absolute Basophils 0.1 0.0 - 0.2 10e9/L Final    Abs Immature Granulocytes 0.0 0 - 0.4 10e9/L Final         5/7/2018  6:56 AM      Component Results     Component Value Ref Range & Units Status    Sodium 139 133 - 144 mmol/L Final    Potassium 4.0 3.4 - 5.3 mmol/L Final    Chloride 107 94 - 109 mmol/L Final    Carbon Dioxide 25 20 - 32 mmol/L Final    Anion Gap 7 3 - 14 mmol/L Final    Glucose 136 (H) 70 - 99 mg/dL Final    Urea Nitrogen 17 7 - 30 mg/dL Final    Creatinine 0.98 0.66 - 1.25 mg/dL Final    GFR Estimate 87 >60 mL/min/1.7m2 Final    Non  GFR Calc    GFR Estimate If Black >90 >60 mL/min/1.7m2 Final    African American GFR Calc    Calcium 9.0 8.5 - 10.1 mg/dL Final    Bilirubin Total 0.8 0.2 - 1.3 mg/dL Final    Albumin 4.4 3.4 - 5.0 g/dL Final    Protein Total 7.9 6.8 - 8.8 g/dL Final    Alkaline Phosphatase 75 40 - 150 U/L Final    ALT 25 0 - 70 U/L Final    AST 20 0 - 45 U/L Final         5/7/2018  6:56 AM      Component Results     Component Value Ref  "Range & Units Status    Troponin I ES <0.015 0.000 - 0.045 ug/L Final    The 99th percentile for upper reference range is 0.045 ug/L.  Troponin values   in the range of 0.045 - 0.120 ug/L may be associated with risks of adverse   clinical events.           2018  7:53 AM      Narrative     CHEST TWO VIEWS May 7, 2018 7:32 AM     HISTORY: 32-year-old with pain.    COMPARISON: None.         Impression     IMPRESSION: Heart size is normal. No pleural effusion, pneumothorax,  or abnormal area of consolidation.                Thank you for choosing [facility]       Thank you for choosing [facility] for your care. Our goal is always to provide you with excellent care. Hearing back from our patients is one way we can continue to improve our services. Please take a few minutes to complete the written survey that you may receive in the mail after you visit with us. Thank you!        [facility] Information     [facility] lets you send messages to your doctor, view your test results, renew your prescriptions, schedule appointments and more. To sign up, go to [email or web address] . Click on \"Log in\" on the left side of the screen, which will take you to the Welcome page. Then click on \"Sign up Now\" on the right side of the page.     You will be asked to enter the access code listed below, as well as some personal information. Please follow the directions to create your username and password.     Your access code is: [email or web address]  Expires: 2018  7:42 AM     Your access code will  in 90 days. If you need help or a new code, please call your [facility] clinic or [phone].        Care EveryWhere ID     This is your Care EveryWhere ID. This could be used by other organizations to access your [facility] medical records  [id number]        Equal Access to Services     LAUREN GREGORY : gio Lainez, [address]. So [facility] " [phone].    ATENCIÓN: Si habla español, tiene a pitts disposición servicios gratuitos de asistencia lingüística. Llame al [phone].    We comply with applicable federal civil rights laws and [place] laws. We do not discriminate on the basis of race, color, national origin, age, disability, sex, sexual orientation, or gender identity.            After Visit Summary       This is your record. Keep this with you and show to your community pharmacist(s) and doctor(s) at your next visit.

## 2018-05-07 NOTE — DISCHARGE INSTRUCTIONS
*CHEST PAIN, UNCERTAIN CAUSE    Based on your exam today, the exact cause of your chest pain is not certain. Your condition does not seem serious at this time, and your pain does not appear to be coming from your heart. However, sometimes the signs of a serious problem take more time to appear. Therefore, watch for the warning signs listed below.  HOME CARE:    1. Rest today and avoid strenuous activity.  2. Take any prescribed medicine as directed.  FOLLOW UP with your doctor in 1-3 days.   GET PROMPT MEDICAL ATTENTION if any of the following occur:    A change in the type of pain: if it feels different, becomes more severe, lasts longer, or begins to spread into your shoulder, arm, neck, jaw or back    Shortness of breath or increased pain with breathing    Weakness, dizziness, or fainting    Cough with blood or dark colored sputum (phlegm)    Fever over 101  F (38.3  C)    Swelling, pain or redness in one leg    3065-4764 [employer]. [address] [place] [address]. All rights reserved. This information is not intended as a substitute for professional medical care. Always follow your healthcare professional's instructions.  This information has been modified by your health care provider with permission from the publisher.

## 2018-05-07 NOTE — ED AVS SNAPSHOT
[facility]    [address] [address]    Phone:  [phone]    Fax:  [phone]                                       Serge Mcfarlane   MRN: [id number]    Department:  [facility]   Date of Visit:  5/7/2018           After Visit Summary Signature Page     I have received my discharge instructions, and my questions have been answered. I have discussed any challenges I see with this plan with the nurse or doctor.    ..........................................................................................................................................  Patient/Patient Representative Signature      ..........................................................................................................................................  Patient Representative Print Name and Relationship to Patient    ..................................................               ................................................  Date                                            Time    ..........................................................................................................................................  Reviewed by Signature/Title    ...................................................              ..............................................  Date                                                            Time
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
## 2018-05-07 NOTE — ED PROVIDER NOTES
"  History     Chief Complaint   Patient presents with     Chest Pain     Shortness of Breath     HPI  Serge Mcfarlane is a 32 year old male who presents with feeling of shortness of breath, chest tightness, nausea vomiting ×1.  Began this morning upon awakening.  Felt near syncopal on the way to work prompting evaluation here. He denies headache, visual change, difficulty swallowing or speaking, peripheral numbness weakness or paresthesia.  He denies recent febrile illness or cough.  He smokes 1/2-3/4 pack cigarettes daily.  Denies illicit drug use.  Drank approximately 24 beers over the weekend which he typically does.  He discontinued drinking vodka a few weeks ago because \"it had gotten out of hand\".  He works as a .  Has history of anxiety, previously treated with Paxil but discontinued secondary to side effects.  He has history of reflux and is taking omeprazole.    Problem List:    There are no active problems to display for this patient.       Past Medical History:    Past Medical History:   Diagnosis Date     NO ACTIVE PROBLEMS        Past Surgical History:    Past Surgical History:   Procedure Laterality Date     ARTHROSCOPY SHOULDER RT/LT      Left Shoulder for Rotator Cuff Tear       Family History:    No family history on file.    Social History:  Marital Status:  Single [1]  Social History   Substance Use Topics     Smoking status: Current Every Day Smoker     Packs/day: 1.00     Years: 6.00     Types: Cigarettes     Smokeless tobacco: Former User     Alcohol use 15.0 oz/week        Medications:      omeprazole (PRILOSEC) 40 MG capsule   ibuprofen (ADVIL/MOTRIN) 200 MG tablet   oxybutynin (DITROPAN XL) 5 MG 24 hr tablet   oxyCODONE-acetaminophen (PERCOCET) 5-325 MG per tablet         Review of Systems  All other systems reviewed and are negative.    Physical Exam   BP: (!) 154/105  Heart Rate: 81  Temp: 97.7  F (36.5  C)  Resp: 16  SpO2: 100 %      Physical Exam  Nontoxic appearing no " respiratory distress alert and oriented ×3  Head atraumatic normocephalic  TMs/EACs unremarkable, conjunctiva noninjected, oropharynx moist without lesions or erythema  No cervical adenopathy   Neck supple full active painless range of motion  Lungs clear to auscultation  Heart regular no murmur  Abdomen soft nontender bowel sounds positive no masses or HSM  Strength and sensation grossly intact throughout the extremities, gait and station normal  Speech is fluent, good eye contact, thought processes are rational  Lower extremities without swelling, redness or tenderness  Pedal pulses symmetrical and strong    ED Course     ED Course     Procedures  EKG time 624, normal sinus rhythm rate 84, axis intervals within normal limits, first-degree AV block TOMY 238, no prior for comparison, no acute ST or T-wave changes, read by Dr. Escobar Lewis             Critical Care time:  none               Results for orders placed or performed during the hospital encounter of 05/07/18 (from the past 24 hour(s))   CBC with platelets differential   Result Value Ref Range    WBC 6.2 4.0 - 11.0 10e9/L    RBC Count 5.28 4.4 - 5.9 10e12/L    Hemoglobin 16.0 13.3 - 17.7 g/dL    Hematocrit 45.3 40.0 - 53.0 %    MCV 86 78 - 100 fl    MCH 30.3 26.5 - 33.0 pg    MCHC 35.3 31.5 - 36.5 g/dL    RDW 11.5 10.0 - 15.0 %    Platelet Count 220 150 - 450 10e9/L    Diff Method Automated Method     % Neutrophils 48.7 %    % Lymphocytes 31.3 %    % Monocytes 15.6 %    % Eosinophils 3.2 %    % Basophils 1.0 %    % Immature Granulocytes 0.2 %    Absolute Neutrophil 3.0 1.6 - 8.3 10e9/L    Absolute Lymphocytes 1.9 0.8 - 5.3 10e9/L    Absolute Monocytes 1.0 0.0 - 1.3 10e9/L    Absolute Eosinophils 0.2 0.0 - 0.7 10e9/L    Absolute Basophils 0.1 0.0 - 0.2 10e9/L    Abs Immature Granulocytes 0.0 0 - 0.4 10e9/L   Comprehensive metabolic panel   Result Value Ref Range    Sodium 139 133 - 144 mmol/L    Potassium 4.0 3.4 - 5.3 mmol/L    Chloride 107 94 - 109 mmol/L     Carbon Dioxide 25 20 - 32 mmol/L    Anion Gap 7 3 - 14 mmol/L    Glucose 136 (H) 70 - 99 mg/dL    Urea Nitrogen 17 7 - 30 mg/dL    Creatinine 0.98 0.66 - 1.25 mg/dL    GFR Estimate 87 >60 mL/min/1.7m2    GFR Estimate If Black >90 >60 mL/min/1.7m2    Calcium 9.0 8.5 - 10.1 mg/dL    Bilirubin Total 0.8 0.2 - 1.3 mg/dL    Albumin 4.4 3.4 - 5.0 g/dL    Protein Total 7.9 6.8 - 8.8 g/dL    Alkaline Phosphatase 75 40 - 150 U/L    ALT 25 0 - 70 U/L    AST 20 0 - 45 U/L   Troponin I   Result Value Ref Range    Troponin I ES <0.015 0.000 - 0.045 ug/L   XR Chest 2 Views    Narrative    CHEST TWO VIEWS May 7, 2018 7:32 AM     HISTORY: 32-year-old with pain.    COMPARISON: None.       Impression    IMPRESSION: Heart size is normal. No pleural effusion, pneumothorax,  or abnormal area of consolidation.    NORA MUNGUIA MD       Medications - No data to display    Assessments & Plan (with Medical Decision Making)  32-year-old male smoker presents with substernal chest pain as described in HPI.  Usual differential considered.  No evidence for ACS, ECG without ischemic change, troponin normal, remainder workup within normal limits as well, chest x-ray by my read as well as radiology negative for acute finding.  No indication for further evaluation.  Likely represents chest wall pain.  Follow-up primary care.  Return criteria reviewed per     I have reviewed the nursing notes.    I have reviewed the findings, diagnosis, plan and need for follow up with the patient.       Discharge Medication List as of 5/7/2018  8:05 AM          Final diagnoses:   Chest pain, unspecified type       5/7/2018   [facility] EMERGENCY DEPARTMENT     Escobar Lewis MD  05/08/18 0622

## 2020-09-07 ENCOUNTER — HOSPITAL ENCOUNTER (EMERGENCY)
Facility: CLINIC | Age: 35
Discharge: HOME | End: 2020-09-07
Attending: FAMILY MEDICINE | Admitting: FAMILY MEDICINE
Payer: COMMERCIAL

## 2020-09-07 VITALS
TEMPERATURE: 99 F | RESPIRATION RATE: 16 BRPM | BODY MASS INDEX: 25.67 KG/M2 | HEART RATE: 110 BPM | HEIGHT: 74 IN | WEIGHT: 200 LBS | SYSTOLIC BLOOD PRESSURE: 134 MMHG | DIASTOLIC BLOOD PRESSURE: 95 MMHG

## 2020-09-07 DIAGNOSIS — S05.01XA ABRASION OF RIGHT CORNEA, INITIAL ENCOUNTER: ICD-10-CM

## 2020-09-07 PROCEDURE — 99284 EMERGENCY DEPT VISIT MOD MDM: CPT | Mod: Z6 | Performed by: FAMILY MEDICINE

## 2020-09-07 PROCEDURE — 99283 EMERGENCY DEPT VISIT LOW MDM: CPT | Performed by: FAMILY MEDICINE

## 2020-09-07 RX ORDER — TETRACAINE HYDROCHLORIDE 5 MG/ML
1-2 SOLUTION OPHTHALMIC ONCE
Status: DISCONTINUED | OUTPATIENT
Start: 2020-09-07 | End: 2020-09-07 | Stop reason: HOSPADM

## 2020-09-07 RX ORDER — OFLOXACIN 3 MG/ML
1 SOLUTION/ DROPS OPHTHALMIC
Qty: 1 BOTTLE | Refills: 0 | Status: SHIPPED | OUTPATIENT
Start: 2020-09-07 | End: 2020-09-14

## 2020-09-07 ASSESSMENT — MIFFLIN-ST. JEOR: SCORE: 1906.94

## 2020-09-07 NOTE — DISCHARGE INSTRUCTIONS
ICD-10-CM    1. Abrasion of right cornea, initial encounter  S05.01XA     use ofloxaxin drops every 4 hours while awake for 7 days.  return for vision changes.
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
## 2020-09-07 NOTE — ED AVS SNAPSHOT
[facility]  [address] [address]  Phone:  [phone]  Fax:  [phone]                                    Serge Mcfarlane   MRN: [id number]    Department:  [facility]   Date of Visit:  9/7/2020           After Visit Summary Signature Page    I have received my discharge instructions, and my questions have been answered. I have discussed any challenges I see with this plan with the nurse or doctor.    ..........................................................................................................................................  Patient/Patient Representative Signature      ..........................................................................................................................................  Patient Representative Print Name and Relationship to Patient    ..................................................               ................................................  Date                                   Time    ..........................................................................................................................................  Reviewed by Signature/Title    ...................................................              ..............................................  Date                                               Time          22EPIC Rev 08/18

## 2021-06-20 ENCOUNTER — HOSPITAL ENCOUNTER (EMERGENCY)
Facility: CLINIC | Age: 36
Discharge: HOME | End: 2021-06-20
Attending: EMERGENCY MEDICINE | Admitting: EMERGENCY MEDICINE
Payer: COMMERCIAL

## 2021-06-20 VITALS
BODY MASS INDEX: 26.18 KG/M2 | RESPIRATION RATE: 18 BRPM | DIASTOLIC BLOOD PRESSURE: 122 MMHG | TEMPERATURE: 99 F | SYSTOLIC BLOOD PRESSURE: 176 MMHG | OXYGEN SATURATION: 98 % | HEART RATE: 76 BPM | HEIGHT: 74 IN | WEIGHT: 204 LBS

## 2021-06-20 DIAGNOSIS — J01.01 ACUTE RECURRENT MAXILLARY SINUSITIS: ICD-10-CM

## 2021-06-20 PROCEDURE — 250N000012 HC RX MED GY IP 250 OP 636 PS 637: Performed by: EMERGENCY MEDICINE

## 2021-06-20 PROCEDURE — 99283 EMERGENCY DEPT VISIT LOW MDM: CPT | Performed by: EMERGENCY MEDICINE

## 2021-06-20 PROCEDURE — 99284 EMERGENCY DEPT VISIT MOD MDM: CPT | Performed by: EMERGENCY MEDICINE

## 2021-06-20 RX ORDER — PREDNISONE 20 MG/1
TABLET ORAL
Qty: 10 TABLET | Refills: 0 | Status: SHIPPED | OUTPATIENT
Start: 2021-06-20 | End: 2023-03-09

## 2021-06-20 RX ORDER — PREDNISONE 20 MG/1
40 TABLET ORAL ONCE
Status: COMPLETED | OUTPATIENT
Start: 2021-06-20 | End: 2021-06-20

## 2021-06-20 RX ADMIN — PREDNISONE 40 MG: 20 TABLET ORAL at 21:08

## 2021-06-20 ASSESSMENT — MIFFLIN-ST. JEOR: SCORE: 1920.09

## 2021-06-21 NOTE — ED PROVIDER NOTES
"  History     Chief Complaint   Patient presents with     Sinusitis     tx with abx in May. Cleared. Sx restarted yesterday morning. Left facial pain. Hx of sinus infections.     HPI  Serge Mcfarlane is a 35 year old male who presents secondary to left facial pain waxing and waning since early May.  Finished up 2-week course of Augmentin several weeks ago.  Describes some clear rhinorrhea without blood or purulence.  Denies fever or URI.  Does have some environmental allergies.  No history of sinus surgery no history of facial injury.  Patient is a smoker.    Allergies:  No Known Allergies    Problem List:    There are no active problems to display for this patient.       Past Medical History:    Past Medical History:   Diagnosis Date     NO ACTIVE PROBLEMS        Past Surgical History:    Past Surgical History:   Procedure Laterality Date     ARTHROSCOPY SHOULDER RT/LT      Left Shoulder for Rotator Cuff Tear       Family History:    No family history on file.    Social History:  Marital Status:  Single [1]  Social History     Tobacco Use     Smoking status: Current Every Day Smoker     Packs/day: 1.00     Years: 6.00     Pack years: 6.00     Types: Cigarettes     Smokeless tobacco: Former User   Substance Use Topics     Alcohol use: Yes     Alcohol/week: 25.0 standard drinks     Drug use: No        Medications:    amoxicillin-clavulanate (AUGMENTIN) 875-125 MG tablet  predniSONE (DELTASONE) 20 MG tablet  ibuprofen (ADVIL/MOTRIN) 200 MG tablet  omeprazole (PRILOSEC) 40 MG capsule  oxybutynin (DITROPAN XL) 5 MG 24 hr tablet  oxyCODONE-acetaminophen (PERCOCET) 5-325 MG per tablet          Review of Systems  Problem focused review of systems otherwise negative    Physical Exam   BP: (!) 176/122  Pulse: 76  Temp: 99  F (37.2  C)  Resp: 18  Height: 188 cm (6' 2\")  Weight: 92.5 kg (204 lb)  SpO2: 98 %      Physical Exam  Nontoxic-appearing no respiratory distress alert and oriented  Skin pink warm and dry  Head " atraumatic normocephalic  EACs/TMs are unremarkable  Nares clear no rhinorrhea mucosa is not boggy  Some tenderness left maxillary sinus region no significant associated swelling or redness  Oropharynx moist dentition intact gingiva are unremarkable  ED Course        Procedures               Critical Care time:  none               No results found for this or any previous visit (from the past 24 hour(s)).    Medications   predniSONE (DELTASONE) tablet 40 mg (40 mg Oral Given 6/20/21 2108)       Assessments & Plan (with Medical Decision Making)  Left facial pain, question allergies/sinusitis.  Recent treatment with 2-week course of Augmentin with resolution of symptoms for about 3 weeks.  Currently taking OTC antihistamines.  Recommend some Afrin and prednisone.  Augmentin prescription fill if symptoms persist.  Follow-up ENT.  Return criteria reviewed     I have reviewed the nursing notes.    I have reviewed the findings, diagnosis, plan and need for follow up with the patient.          Discharge Medication List as of 6/20/2021  9:08 PM      START taking these medications    Details   amoxicillin-clavulanate (AUGMENTIN) 875-125 MG tablet Take 1 tablet by mouth 2 times daily for 10 days, Disp-20 tablet, R-0, Local Print      predniSONE (DELTASONE) 20 MG tablet Take two tablets (= 40mg) each day for 5 (five) days, Disp-10 tablet, R-0, Local Print             Final diagnoses:   Acute recurrent maxillary sinusitis       6/20/2021   [facility] EMERGENCY DEPT     Escobar Lewis MD  06/20/21 5673

## 2021-06-21 NOTE — ED TRIAGE NOTES
Patient tx with 2 week course of antibiotics at the beginning of May. Sinus infection cleared. Yesterday started to experience left facial pain again. Took Extra strength Tylenol and allergy pill today without relief. Hx of left sided sinus infections. Rates pain 10/10. Denies fever or chills.

## 2021-09-12 ENCOUNTER — HEALTH MAINTENANCE LETTER (OUTPATIENT)
Age: 36
End: 2021-09-12

## 2022-11-19 ENCOUNTER — HEALTH MAINTENANCE LETTER (OUTPATIENT)
Age: 37
End: 2022-11-19

## 2023-03-09 ENCOUNTER — HOSPITAL ENCOUNTER (EMERGENCY)
Facility: CLINIC | Age: 38
Discharge: HOME | End: 2023-03-09
Attending: EMERGENCY MEDICINE | Admitting: EMERGENCY MEDICINE
Payer: COMMERCIAL

## 2023-03-09 ENCOUNTER — APPOINTMENT (OUTPATIENT)
Dept: CT IMAGING | Facility: CLINIC | Age: 38
End: 2023-03-09
Attending: EMERGENCY MEDICINE
Payer: COMMERCIAL

## 2023-03-09 VITALS
OXYGEN SATURATION: 100 % | DIASTOLIC BLOOD PRESSURE: 112 MMHG | HEART RATE: 72 BPM | SYSTOLIC BLOOD PRESSURE: 159 MMHG | WEIGHT: 200 LBS | BODY MASS INDEX: 25.67 KG/M2 | HEIGHT: 74 IN

## 2023-03-09 DIAGNOSIS — N20.1 LEFT URETERAL STONE: ICD-10-CM

## 2023-03-09 LAB
ALBUMIN UR-MCNC: 30 MG/DL
ANION GAP SERPL CALCULATED.3IONS-SCNC: 10 MMOL/L (ref 7–15)
APPEARANCE UR: ABNORMAL
BASOPHILS # BLD AUTO: 0.1 10E3/UL (ref 0–0.2)
BASOPHILS NFR BLD AUTO: 1 %
BILIRUB UR QL STRIP: NEGATIVE
BUN SERPL-MCNC: 14 MG/DL (ref 6–20)
CALCIUM SERPL-MCNC: 9.5 MG/DL (ref 8.6–10)
CHLORIDE SERPL-SCNC: 104 MMOL/L (ref 98–107)
COLOR UR AUTO: YELLOW
CREAT SERPL-MCNC: 0.98 MG/DL (ref 0.67–1.17)
DEPRECATED HCO3 PLAS-SCNC: 25 MMOL/L (ref 22–29)
EOSINOPHIL # BLD AUTO: 0.3 10E3/UL (ref 0–0.7)
EOSINOPHIL NFR BLD AUTO: 4 %
ERYTHROCYTE [DISTWIDTH] IN BLOOD BY AUTOMATED COUNT: 11.9 % (ref 10–15)
GFR SERPL CREATININE-BSD FRML MDRD: >90 ML/MIN/1.73M2
GLUCOSE SERPL-MCNC: 122 MG/DL (ref 70–99)
GLUCOSE UR STRIP-MCNC: NEGATIVE MG/DL
HCT VFR BLD AUTO: 45.8 % (ref 40–53)
HGB BLD-MCNC: 15.8 G/DL (ref 13.3–17.7)
HGB UR QL STRIP: ABNORMAL
HOLD SPECIMEN: NORMAL
IMM GRANULOCYTES # BLD: 0 10E3/UL
IMM GRANULOCYTES NFR BLD: 0 %
KETONES UR STRIP-MCNC: NEGATIVE MG/DL
LEUKOCYTE ESTERASE UR QL STRIP: NEGATIVE
LYMPHOCYTES # BLD AUTO: 2.7 10E3/UL (ref 0.8–5.3)
LYMPHOCYTES NFR BLD AUTO: 40 %
MCH RBC QN AUTO: 30.6 PG (ref 26.5–33)
MCHC RBC AUTO-ENTMCNC: 34.5 G/DL (ref 31.5–36.5)
MCV RBC AUTO: 89 FL (ref 78–100)
MONOCYTES # BLD AUTO: 1 10E3/UL (ref 0–1.3)
MONOCYTES NFR BLD AUTO: 14 %
MUCOUS THREADS #/AREA URNS LPF: PRESENT /LPF
NEUTROPHILS # BLD AUTO: 2.7 10E3/UL (ref 1.6–8.3)
NEUTROPHILS NFR BLD AUTO: 41 %
NITRATE UR QL: NEGATIVE
NRBC # BLD AUTO: 0 10E3/UL
NRBC BLD AUTO-RTO: 0 /100
PH UR STRIP: 5 [PH] (ref 5–7)
PLATELET # BLD AUTO: 227 10E3/UL (ref 150–450)
POTASSIUM SERPL-SCNC: 3.5 MMOL/L (ref 3.4–5.3)
RBC # BLD AUTO: 5.16 10E6/UL (ref 4.4–5.9)
RBC URINE: >182 /HPF
SODIUM SERPL-SCNC: 139 MMOL/L (ref 136–145)
SP GR UR STRIP: 1.02 (ref 1–1.03)
UROBILINOGEN UR STRIP-MCNC: NORMAL MG/DL
WBC # BLD AUTO: 6.7 10E3/UL (ref 4–11)
WBC URINE: 0 /HPF

## 2023-03-09 PROCEDURE — 99285 EMERGENCY DEPT VISIT HI MDM: CPT | Mod: 25 | Performed by: EMERGENCY MEDICINE

## 2023-03-09 PROCEDURE — 74176 CT ABD & PELVIS W/O CONTRAST: CPT

## 2023-03-09 PROCEDURE — 99284 EMERGENCY DEPT VISIT MOD MDM: CPT | Performed by: EMERGENCY MEDICINE

## 2023-03-09 PROCEDURE — 82310 ASSAY OF CALCIUM: CPT | Performed by: EMERGENCY MEDICINE

## 2023-03-09 PROCEDURE — 85004 AUTOMATED DIFF WBC COUNT: CPT | Performed by: EMERGENCY MEDICINE

## 2023-03-09 PROCEDURE — 81003 URINALYSIS AUTO W/O SCOPE: CPT | Performed by: EMERGENCY MEDICINE

## 2023-03-09 PROCEDURE — 250N000011 HC RX IP 250 OP 636: Performed by: EMERGENCY MEDICINE

## 2023-03-09 PROCEDURE — 96374 THER/PROPH/DIAG INJ IV PUSH: CPT | Performed by: EMERGENCY MEDICINE

## 2023-03-09 PROCEDURE — 36415 COLL VENOUS BLD VENIPUNCTURE: CPT | Performed by: EMERGENCY MEDICINE

## 2023-03-09 RX ORDER — ONDANSETRON 4 MG/1
4 TABLET, ORALLY DISINTEGRATING ORAL EVERY 8 HOURS PRN
Qty: 10 TABLET | Refills: 0 | Status: SHIPPED | OUTPATIENT
Start: 2023-03-09

## 2023-03-09 RX ORDER — OXYCODONE HYDROCHLORIDE 5 MG/1
5 TABLET ORAL EVERY 6 HOURS PRN
Qty: 10 TABLET | Refills: 0 | Status: SHIPPED | OUTPATIENT
Start: 2023-03-09

## 2023-03-09 RX ORDER — KETOROLAC TROMETHAMINE 15 MG/ML
15 INJECTION, SOLUTION INTRAMUSCULAR; INTRAVENOUS ONCE
Status: COMPLETED | OUTPATIENT
Start: 2023-03-09 | End: 2023-03-09

## 2023-03-09 RX ORDER — HYDROMORPHONE HYDROCHLORIDE 1 MG/ML
0.5 INJECTION, SOLUTION INTRAMUSCULAR; INTRAVENOUS; SUBCUTANEOUS
Status: DISCONTINUED | OUTPATIENT
Start: 2023-03-09 | End: 2023-03-09 | Stop reason: HOSPADM

## 2023-03-09 RX ORDER — OXYCODONE HYDROCHLORIDE 5 MG/1
5 TABLET ORAL ONCE
Status: DISCONTINUED | OUTPATIENT
Start: 2023-03-09 | End: 2023-03-09 | Stop reason: HOSPADM

## 2023-03-09 RX ADMIN — KETOROLAC TROMETHAMINE 15 MG: 15 INJECTION, SOLUTION INTRAMUSCULAR; INTRAVENOUS at 04:26

## 2023-03-09 ASSESSMENT — ACTIVITIES OF DAILY LIVING (ADL): ADLS_ACUITY_SCORE: 35

## 2023-03-09 NOTE — ED PROVIDER NOTES
"  History     Chief Complaint   Patient presents with     Flank Pain     HPI  Serge Mcfarlane is a 37 year old male who presents for left flank pain.  Pain has been intermittent over the past day, much worse this morning, woke him from sleep about 1.5 hours prior to his arrival.  Pain is sharp in the left flank and radiates to the left lower quadrant.  He has never had pain like this before.  No fever, nausea, vomiting, diarrhea, dysuria, hematuria, or rash.  No prior abdominal surgeries.  He has not taking thing for the pain.    Allergies:  No Known Allergies    Problem List:    There are no problems to display for this patient.       Past Medical History:    Past Medical History:   Diagnosis Date     NO ACTIVE PROBLEMS        Past Surgical History:    Past Surgical History:   Procedure Laterality Date     ARTHROSCOPY SHOULDER RT/LT      Left Shoulder for Rotator Cuff Tear       Family History:    No family history on file.    Social History:  Marital Status:  Single [1]  Social History     Tobacco Use     Smoking status: Every Day     Packs/day: 1.00     Years: 6.00     Pack years: 6.00     Types: Cigarettes     Smokeless tobacco: Former   Substance Use Topics     Alcohol use: Yes     Alcohol/week: 25.0 standard drinks     Drug use: No        Medications:    ondansetron (ZOFRAN ODT) 4 MG ODT tab  oxyCODONE (ROXICODONE) 5 MG tablet  oxybutynin (DITROPAN XL) 5 MG 24 hr tablet          Review of Systems    Physical Exam   BP: (!) 165/101  Pulse: 72  Height: 188 cm (6' 2\")  Weight: 90.7 kg (200 lb)  SpO2: 100 %      Physical Exam  Vitals and nursing note reviewed.   Constitutional:       General: He is in acute distress.      Appearance: He is well-developed. He is not diaphoretic.   HENT:      Head: Normocephalic and atraumatic.      Right Ear: External ear normal.      Left Ear: External ear normal.      Nose: Nose normal.   Eyes:      General: No scleral icterus.     Conjunctiva/sclera: Conjunctivae normal. "   Cardiovascular:      Rate and Rhythm: Normal rate and regular rhythm.   Pulmonary:      Effort: Pulmonary effort is normal. No respiratory distress.      Breath sounds: No stridor.   Abdominal:      General: There is no distension.      Palpations: Abdomen is soft.      Tenderness: There is no abdominal tenderness. There is no right CVA tenderness, left CVA tenderness, guarding or rebound.      Hernia: There is no hernia in the left inguinal area or right inguinal area.   Genitourinary:     Penis: Normal.       Testes: Normal.   Musculoskeletal:      Cervical back: Normal range of motion.   Skin:     General: Skin is warm and dry.   Neurological:      Mental Status: He is alert and oriented to person, place, and time.   Psychiatric:         Behavior: Behavior normal.         ED Course                 Procedures              Critical Care time:  none               Results for orders placed or performed during the hospital encounter of 03/09/23 (from the past 24 hour(s))   [place] Draw    Narrative    The following orders were created for panel order [place] Draw.  Procedure                               Abnormality         Status                     ---------                               -----------         ------                     Extra Blue Top Tube[[id number]]                              In process                 Extra Red Top Tube[[id number]]                               In process                 Extra Green Top (Lithium...[[id number]]                      In process                 Extra Purple Top Tube[[id number]]                            In process                   Please view results for these tests on the individual orders.   Basic metabolic panel   Result Value Ref Range    Sodium 139 136 - 145 mmol/L    Potassium 3.5 3.4 - 5.3 mmol/L    Chloride 104 98 - 107 mmol/L    Carbon Dioxide (CO2) 25 22 - 29 mmol/L    Anion Gap 10 7 - 15 mmol/L    Urea Nitrogen 14.0 6.0 - 20.0 mg/dL    Creatinine 0.98 0.67  - 1.17 mg/dL    Calcium 9.5 8.6 - 10.0 mg/dL    Glucose 122 (H) 70 - 99 mg/dL    GFR Estimate >90 >60 mL/min/1.73m2   CBC with Platelets & Differential    Narrative    The following orders were created for panel order CBC with Platelets & Differential.  Procedure                               Abnormality         Status                     ---------                               -----------         ------                     CBC with platelets and d...[[id number]]                      Final result                 Please view results for these tests on the individual orders.   CBC with platelets and differential   Result Value Ref Range    WBC Count 6.7 4.0 - 11.0 10e3/uL    RBC Count 5.16 4.40 - 5.90 10e6/uL    Hemoglobin 15.8 13.3 - 17.7 g/dL    Hematocrit 45.8 40.0 - 53.0 %    MCV 89 78 - 100 fL    MCH 30.6 26.5 - 33.0 pg    MCHC 34.5 31.5 - 36.5 g/dL    RDW 11.9 10.0 - 15.0 %    Platelet Count 227 150 - 450 10e3/uL    % Neutrophils 41 %    % Lymphocytes 40 %    % Monocytes 14 %    % Eosinophils 4 %    % Basophils 1 %    % Immature Granulocytes 0 %    NRBCs per 100 WBC 0 <1 /100    Absolute Neutrophils 2.7 1.6 - 8.3 10e3/uL    Absolute Lymphocytes 2.7 0.8 - 5.3 10e3/uL    Absolute Monocytes 1.0 0.0 - 1.3 10e3/uL    Absolute Eosinophils 0.3 0.0 - 0.7 10e3/uL    Absolute Basophils 0.1 0.0 - 0.2 10e3/uL    Absolute Immature Granulocytes 0.0 <=0.4 10e3/uL    Absolute NRBCs 0.0 10e3/uL   UA with Microscopic reflex to Culture    Specimen: Urine, Clean Catch   Result Value Ref Range    Color Urine Yellow Colorless, Straw, Light Yellow, Yellow    Appearance Urine Slightly Cloudy (A) Clear    Glucose Urine Negative Negative mg/dL    Bilirubin Urine Negative Negative    Ketones Urine Negative Negative mg/dL    Specific Gravity Urine 1.020 1.003 - 1.035    Blood Urine Large (A) Negative    pH Urine 5.0 5.0 - 7.0    Protein Albumin Urine 30 (A) Negative mg/dL    Urobilinogen Urine Normal Normal, 2.0 mg/dL    Nitrite Urine  Negative Negative    Leukocyte Esterase Urine Negative Negative    Mucus Urine Present (A) None Seen /LPF    RBC Urine >182 (H) <=2 /HPF    WBC Urine 0 <=5 /HPF    Narrative    Urine Culture not indicated   Abd/pelvis CT - no contrast - Stone Protocol    Narrative    EXAM: CT ABDOMEN PELVIS W/O CONTRAST  LOCATION: [facility]  DATE/TIME: 3/9/2023 4:45 AM    INDICATION: Left flank pain.  COMPARISON: None.  TECHNIQUE: CT scan of the abdomen and pelvis was performed without IV contrast. Multiplanar reformats were obtained. Dose reduction techniques were used.  CONTRAST: None.    FINDINGS:   LOWER CHEST: Normal.    HEPATOBILIARY: Normal.    PANCREAS: Normal.    SPLEEN: Normal.    ADRENAL GLANDS: Normal.    KIDNEYS/BLADDER: There is mild dilatation of the left renal collecting system and ureter into the pelvis where there is a 3 mm distal ureteral stone approximately 2 cm above the ureterovesical junction. There is a single 1 mm stone in the lower pole of   the right kidney. Probable high density cyst at the lateral aspect of the right kidney measuring 1.5 cm.    BOWEL: Normal.    LYMPH NODES: Normal.    VASCULATURE: Unremarkable.    PELVIC ORGANS: Normal.    MUSCULOSKELETAL: Normal.      Impression    IMPRESSION:   1.  Mild left hydronephrosis caused by a 3 mm distal ureteral stone.  2.  Single small right renal stone.         Medications   HYDROmorphone (PF) (DILAUDID) injection 0.5 mg (has no administration in time range)   oxyCODONE (ROXICODONE) tablet 5 mg (has no administration in time range)   ketorolac (TORADOL) injection 15 mg (15 mg Intravenous $Given 3/9/23 7494)       Assessments & Plan (with Medical Decision Making)   37-year-old male presents for left flank pain that has been intermittent over the past day.  He is given IV hydromorphone and ketorolac for symptoms.  Genitourinary exam is reassuring without signs of testicular torsion or incarcerated hernia.  Electrolytes are within  normal limits.  Creatinine is normal, no signs of kidney failure.  White blood cell count 6.7 which is reassuring and his hemoglobin is 15.8.  CT of the abdomen pelvis obtained, images reviewed independently as well as radiology read reviewed, no signs of AAA or renal infarction, it is positive for 3 mm stone in the distal left ureter.  This is consistent with the patient's symptoms.  His urinalysis did return positive for blood, negative for signs of infection.  On recheck he is feeling much better.  Tolerating oral intake and is safe to discharge with a prescription for ondansetron and oxycodone and instructions to use acetaminophen and ibuprofen initially for symptoms.  Return for fevers, worsening pain, or other concerns, otherwise follow-up urology clinic.  The patient is in agreement with this plan.    I have reviewed the nursing notes.    I have reviewed the findings, diagnosis, plan and need for follow up with the patient.           Medical Decision Making  The patient's presentation was of high complexity (an acute health issue posing potential threat to life or bodily function).    The patient's evaluation involved:  ordering and/or review of 3+ test(s) in this encounter (see separate area of note for details)    The patient's management necessitated high risk (a parenteral controlled substance).        New Prescriptions    ONDANSETRON (ZOFRAN ODT) 4 MG ODT TAB    Take 1 tablet (4 mg) by mouth every 8 hours as needed for nausea    OXYCODONE (ROXICODONE) 5 MG TABLET    Take 1 tablet (5 mg) by mouth every 6 hours as needed for severe pain (7-10)       Final diagnoses:   Left ureteral stone       3/9/2023   [facility] EMERGENCY DEPT     Adithya Quijano MD  03/09/23 6723

## 2023-03-09 NOTE — ED TRIAGE NOTES
Pt reports left flank pain x 2 days that radiates to LLQ. Pt denied hx of kidney stones, N/V, hematuria, dysuria or abd surgeries.        Triage Assessment     Row Name 03/09/23 0416       Triage Assessment (Adult)    Airway WDL WDL       Respiratory WDL    Respiratory WDL WDL       Skin Circulation/Temperature WDL    Skin Circulation/Temperature WDL WDL       Cardiac WDL    Cardiac WDL WDL       Peripheral/Neurovascular WDL    Peripheral Neurovascular WDL WDL       Cognitive/Neuro/Behavioral WDL    Cognitive/Neuro/Behavioral WDL WDL

## 2023-03-09 NOTE — DISCHARGE INSTRUCTIONS
You have a kidney stone that is causing your pain.  This will likely pass on its own over the next several days to a week.  The pain will likely come and go some as your body is working to get rid of the stone.  Drink plenty of fluids to stay hydrated.  Return immediately if you have fevers, uncontrollable pain, repeated vomiting, or other concerns.  Otherwise follow-up in urology clinic.    Use ibuprofen 400 mg and acetaminophen 650 mg every 6 hours for your symptoms.  Use oxycodone as needed for pain that is not controlled by the prior two medications.    Oxycodone is an addictive opioid medication, please only take it when the pain is more than can be controlled by acetaminophen or ibuprofen alone. It will also make you lightheaded, nauseated, and constipated.  Do not drive, operate heavy machinery, or take care of young children while taking this medication. Do not mix it with other medications or drugs that will make you sleepy, such as alcohol.     Repeated studies have shown that the longer you use opioid pain medications, the longer it is until you return to normal function. It is our recommendation that you taper off opioids as quickly as possible with the goal of returning to normal function or near normal function. Long term use of opioids quickly results in growing tolerance to the medication (less of the benefits) and increased dependence (more of the bad side effects).     Pain is very difficult to treat and we can very rarely take away pain completely. If you are having difficulty with pain over several weeks after an injury, you may need to start different medications and therapies (such as physical therapy, graded exercise, massage, and acupuncture). Please talk about this with your regular doctor.     If you are interested in seeking free, confidential treatment referral and information service for individuals and families facing mental health and/or substance use disorders please call  [phone] ([phone])

## 2023-03-14 NOTE — TELEPHONE ENCOUNTER
MEDICAL RECORDS REQUEST   [facility]  Urology Clinic  [address] [place] [address]  PHONE: [phone]  Fax: [phone]        FUTURE VISIT INFORMATION                                                   Serge Mcfarlane, : 1985 scheduled for future visit at [facility]    APPOINTMENT INFORMATION:    Date: 2023    Provider:  Zurdo Parr MD    Reason for Visit/Diagnosis: New stone consult-3mm UVJ stone left side-CT on 2023    REFERRAL INFORMATION:    Referring provider:  Adithya Quijano MD      RECORDS REQUESTED FOR VISIT                                                     NOTES  STATUS/DETAILS   DISCHARGE REPORT from the ER  yes, 2023 -- [facility] ED   MEDICATION LIST  yes   LABS     URINALYSIS (UA)  yes   IMAGES  yes, 2023 -- CT ABD PELVIS     PRE-VISIT CHECKLIST      Record collection complete Yes   Appointment appropriately scheduled           (right time/right provider) Yes   Joint diagnostic appointment coordinated correctly          (ensure right order & amount of time) Yes   MyChart activation Yes   Questionnaire complete If no, please explain PENDING

## 2023-03-28 ENCOUNTER — PRE VISIT (OUTPATIENT)
Dept: UROLOGY | Facility: CLINIC | Age: 38
End: 2023-03-28

## 2023-03-28 ENCOUNTER — VIRTUAL VISIT (OUTPATIENT)
Dept: UROLOGY | Facility: CLINIC | Age: 38
End: 2023-03-28
Payer: COMMERCIAL

## 2023-03-28 DIAGNOSIS — N20.1 LEFT URETERAL STONE: ICD-10-CM

## 2023-03-28 PROCEDURE — 99203 OFFICE O/P NEW LOW 30 MIN: CPT | Mod: VID | Performed by: UROLOGY

## 2023-03-28 NOTE — PROGRESS NOTES
UROLOGY OUTPATIENT VISIT      Chief Complaint:   Left ureteral stone      Synopsis    Serge Mcfarlane is a very pleasant AGE: 37 year old year old person  Who was seen in the emergency room on March 9 for left renal colic secondary to a small distal ureteral stone.  He has felt better since  He is completely  asymptomatic past 2 weeks  Did not see stone pass but not straining  No family hx of stones  No urinary symptoms  No vitamins  No family hx of stones    On my personal review of his CT there is a peripheral cyst in the right lower pole of the kidney that is incompletely characterized           Medications     Current Outpatient Medications   Medication     omeprazole (PRILOSEC) 20 MG DR capsule     sertraline (ZOLOFT) 50 MG tablet     ondansetron (ZOFRAN ODT) 4 MG ODT tab     oxybutynin (DITROPAN XL) 5 MG 24 hr tablet     oxyCODONE (ROXICODONE) 5 MG tablet     No current facility-administered medications for this visit.         The following  distinct labs were reviewed    I personally reviewed all applicable laboratory data and went over findings with patient  Significant for:    CBC RESULTS:  Recent Labs   Lab Test 03/09/23 0421 05/07/18 0625 01/08/17  1330   WBC 6.7 6.2 9.5   HGB 15.8 16.0 16.4    220 200        BMP RESULTS:  Recent Labs   Lab Test 03/09/23 0421 05/07/18 0625 01/08/17  1330    139 142   POTASSIUM 3.5 4.0 4.1   CHLORIDE 104 107 106   CO2 25 25 27   ANIONGAP 10 7 9   * 136* 98   BUN 14.0 17 15   CR 0.98 0.98 0.92   GFRESTIMATED >90 87 >90  Non  GFR Calc     GFRESTBLACK  --  >90 >90  African American GFR Calc         CALCIUM RESULTS:  Recent Labs   Lab Test 03/09/23 0421 05/07/18 0625 01/08/17  1330   ARIANNA 9.5 9.0 9.1       UA RESULTS:   Recent Labs   Lab Test 03/09/23 0433 04/09/18  1442   SG 1.020 1.020   URINEPH 5.0 6.0   NITRITE Negative Negative   RBCU >182* O - 2   WBCU 0 0 - 5         Recent Imaging Report    I personally reviewed all  applicable imaging and went over the below findings with patient.    Results for orders placed or performed during the hospital encounter of 03/09/23   Abd/pelvis CT - no contrast - Stone Protocol    Narrative    EXAM: CT ABDOMEN PELVIS W/O CONTRAST  LOCATION: [facility]  DATE/TIME: 3/9/2023 4:45 AM    INDICATION: Left flank pain.  COMPARISON: None.  TECHNIQUE: CT scan of the abdomen and pelvis was performed without IV contrast. Multiplanar reformats were obtained. Dose reduction techniques were used.  CONTRAST: None.    FINDINGS:   LOWER CHEST: Normal.    HEPATOBILIARY: Normal.    PANCREAS: Normal.    SPLEEN: Normal.    ADRENAL GLANDS: Normal.    KIDNEYS/BLADDER: There is mild dilatation of the left renal collecting system and ureter into the pelvis where there is a 3 mm distal ureteral stone approximately 2 cm above the ureterovesical junction. There is a single 1 mm stone in the lower pole of   the right kidney. Probable high density cyst at the lateral aspect of the right kidney measuring 1.5 cm.    BOWEL: Normal.    LYMPH NODES: Normal.    VASCULATURE: Unremarkable.    PELVIC ORGANS: Normal.    MUSCULOSKELETAL: Normal.      Impression    IMPRESSION:   1.  Mild left hydronephrosis caused by a 3 mm distal ureteral stone.  2.  Single small right renal stone.              Assessment/Plan   37 year old year old person with suspected symptomatic ureteral stone that I would have expected to pass spontaneously given the small size and distal location as well as lack of current symptoms  -Recommend updating imaging in the form of renal ultrasound to ensure no hydronephrosis  -We will also use the renal ultrasound to follow-up on the cyst in his right kidney and ensure there is no suspicious feature  -Update urinalysis to assess for hematuria  -Reviewed the general recommendations for kidney stone prevention      CC:  Presley Nam      Virtual Visit Details    Type of service:  Video Visit      Originating Location (pt. Location): Home    Distant Location (provider location):  Off-site  Platform used for Video Visit: Roel

## 2023-03-28 NOTE — NURSING NOTE
Is the patient currently in the state of [place]? YES    Visit mode:VIDEO    If the visit is dropped, the patient can be reconnected by: VIDEO VISIT: Text to cell phone: [phone]    Will anyone else be joining the visit? NO      How would you like to obtain your AVS? MyChart    Are changes needed to the allergy or medication list? NO    Reason for visit: Kidney stone

## 2023-03-28 NOTE — LETTER
3/28/2023       RE: Serge Mcfarlane  [address]  [facility] [address]     Dear Colleague,    Thank you for referring your patient, Serge Mcfarlane, to the [facility] [place] [facility]. Please see a copy of my visit note below.          UROLOGY OUTPATIENT VISIT      Chief Complaint:   Left ureteral stone      Synopsis    Serge Mcfarlane is a very pleasant AGE: 37 year old year old person  Who was seen in the emergency room on March 9 for left renal colic secondary to a small distal ureteral stone.  He has felt better since  He is completely  asymptomatic past 2 weeks  Did not see stone pass but not straining  No family hx of stones  No urinary symptoms  No vitamins  No family hx of stones    On my personal review of his CT there is a peripheral cyst in the right lower pole of the kidney that is incompletely characterized           Medications     Current Outpatient Medications   Medication    omeprazole (PRILOSEC) 20 MG DR capsule    sertraline (ZOLOFT) 50 MG tablet    ondansetron (ZOFRAN ODT) 4 MG ODT tab    oxybutynin (DITROPAN XL) 5 MG 24 hr tablet    oxyCODONE (ROXICODONE) 5 MG tablet     No current facility-administered medications for this visit.         The following  distinct labs were reviewed    I personally reviewed all applicable laboratory data and went over findings with patient  Significant for:    CBC RESULTS:  Recent Labs   Lab Test 03/09/23 0421 05/07/18 0625 01/08/17  1330   WBC 6.7 6.2 9.5   HGB 15.8 16.0 16.4    220 200        BMP RESULTS:  Recent Labs   Lab Test 03/09/23 0421 05/07/18 0625 01/08/17  1330    139 142   POTASSIUM 3.5 4.0 4.1   CHLORIDE 104 107 106   CO2 25 25 27   ANIONGAP 10 7 9   * 136* 98   BUN 14.0 17 15   CR 0.98 0.98 0.92   GFRESTIMATED >90 87 >90  Non  GFR Calc     GFRESTBLACK  --  >90 >90  African American GFR Calc         CALCIUM  RESULTS:  Recent Labs   Lab Test 03/09/23  0421 05/07/18  0625 01/08/17  1330   ARIANNA 9.5 9.0 9.1       UA RESULTS:   Recent Labs   Lab Test 03/09/23  0433 04/09/18  1442   SG 1.020 1.020   URINEPH 5.0 6.0   NITRITE Negative Negative   RBCU >182* O - 2   WBCU 0 0 - 5         Recent Imaging Report    I personally reviewed all applicable imaging and went over the below findings with patient.    Results for orders placed or performed during the hospital encounter of 03/09/23   Abd/pelvis CT - no contrast - Stone Protocol    Narrative    EXAM: CT ABDOMEN PELVIS W/O CONTRAST  LOCATION: [facility]  DATE/TIME: 3/9/2023 4:45 AM    INDICATION: Left flank pain.  COMPARISON: None.  TECHNIQUE: CT scan of the abdomen and pelvis was performed without IV contrast. Multiplanar reformats were obtained. Dose reduction techniques were used.  CONTRAST: None.    FINDINGS:   LOWER CHEST: Normal.    HEPATOBILIARY: Normal.    PANCREAS: Normal.    SPLEEN: Normal.    ADRENAL GLANDS: Normal.    KIDNEYS/BLADDER: There is mild dilatation of the left renal collecting system and ureter into the pelvis where there is a 3 mm distal ureteral stone approximately 2 cm above the ureterovesical junction. There is a single 1 mm stone in the lower pole of   the right kidney. Probable high density cyst at the lateral aspect of the right kidney measuring 1.5 cm.    BOWEL: Normal.    LYMPH NODES: Normal.    VASCULATURE: Unremarkable.    PELVIC ORGANS: Normal.    MUSCULOSKELETAL: Normal.      Impression    IMPRESSION:   1.  Mild left hydronephrosis caused by a 3 mm distal ureteral stone.  2.  Single small right renal stone.              Assessment/Plan   37 year old year old person with suspected symptomatic ureteral stone that I would have expected to pass spontaneously given the small size and distal location as well as lack of current symptoms  -Recommend updating imaging in the form of renal ultrasound to ensure no  hydronephrosis  -We will also use the renal ultrasound to follow-up on the cyst in his right kidney and ensure there is no suspicious feature  -Update urinalysis to assess for hematuria  -Reviewed the general recommendations for kidney stone prevention      CC:  Presley Nam      Virtual Visit Details    Type of service:  Video Visit     Originating Location (pt. Location): Home    Distant Location (provider location):  Off-site  Platform used for Video Visit: Roel      Sincerely,    Zurdo Parr MD

## 2023-03-29 ENCOUNTER — HOSPITAL ENCOUNTER (OUTPATIENT)
Dept: ULTRASOUND IMAGING | Facility: CLINIC | Age: 38
Discharge: HOME | End: 2023-03-29
Attending: UROLOGY | Admitting: UROLOGY
Payer: COMMERCIAL

## 2023-03-29 DIAGNOSIS — N20.1 LEFT URETERAL STONE: ICD-10-CM

## 2023-03-29 PROCEDURE — 76770 US EXAM ABDO BACK WALL COMP: CPT

## 2023-03-30 ENCOUNTER — LAB (OUTPATIENT)
Dept: LAB | Facility: CLINIC | Age: 38
End: 2023-03-30
Payer: COMMERCIAL

## 2023-03-30 DIAGNOSIS — N20.1 LEFT URETERAL STONE: ICD-10-CM

## 2023-03-30 LAB
ALBUMIN UR-MCNC: NEGATIVE MG/DL
APPEARANCE UR: CLEAR
BILIRUB UR QL STRIP: NEGATIVE
COLOR UR AUTO: YELLOW
GLUCOSE UR STRIP-MCNC: NEGATIVE MG/DL
HGB UR QL STRIP: NEGATIVE
KETONES UR STRIP-MCNC: NEGATIVE MG/DL
LEUKOCYTE ESTERASE UR QL STRIP: NEGATIVE
NITRATE UR QL: NEGATIVE
PH UR STRIP: 6 [PH] (ref 5–7)
SP GR UR STRIP: 1.01 (ref 1–1.03)
UROBILINOGEN UR STRIP-ACNC: 0.2 E.U./DL

## 2023-03-30 PROCEDURE — 81003 URINALYSIS AUTO W/O SCOPE: CPT

## 2023-04-09 ENCOUNTER — HEALTH MAINTENANCE LETTER (OUTPATIENT)
Age: 38
End: 2023-04-09

## 2024-06-16 ENCOUNTER — HEALTH MAINTENANCE LETTER (OUTPATIENT)
Age: 39
End: 2024-06-16

## 2025-06-21 ENCOUNTER — HEALTH MAINTENANCE LETTER (OUTPATIENT)
Age: 40
End: 2025-06-21